# Patient Record
Sex: FEMALE | Race: OTHER | HISPANIC OR LATINO
[De-identification: names, ages, dates, MRNs, and addresses within clinical notes are randomized per-mention and may not be internally consistent; named-entity substitution may affect disease eponyms.]

---

## 2017-06-15 ENCOUNTER — TRANSCRIPTION ENCOUNTER (OUTPATIENT)
Age: 62
End: 2017-06-15

## 2017-06-16 ENCOUNTER — OUTPATIENT (OUTPATIENT)
Dept: OUTPATIENT SERVICES | Facility: HOSPITAL | Age: 62
LOS: 1 days | End: 2017-06-16
Payer: COMMERCIAL

## 2017-06-16 DIAGNOSIS — M54.16 RADICULOPATHY, LUMBAR REGION: ICD-10-CM

## 2017-06-16 PROCEDURE — 62323 NJX INTERLAMINAR LMBR/SAC: CPT

## 2017-06-16 PROCEDURE — 77003 FLUOROGUIDE FOR SPINE INJECT: CPT

## 2017-07-15 ENCOUNTER — OUTPATIENT (OUTPATIENT)
Dept: OUTPATIENT SERVICES | Facility: HOSPITAL | Age: 62
LOS: 1 days | End: 2017-07-15
Payer: COMMERCIAL

## 2017-07-15 DIAGNOSIS — Z51.89 ENCOUNTER FOR OTHER SPECIFIED AFTERCARE: ICD-10-CM

## 2017-07-15 DIAGNOSIS — M54.16 RADICULOPATHY, LUMBAR REGION: ICD-10-CM

## 2017-07-26 ENCOUNTER — OUTPATIENT (OUTPATIENT)
Dept: OUTPATIENT SERVICES | Facility: HOSPITAL | Age: 62
LOS: 1 days | End: 2017-07-26
Payer: COMMERCIAL

## 2017-07-26 DIAGNOSIS — M54.16 RADICULOPATHY, LUMBAR REGION: ICD-10-CM

## 2017-07-26 PROCEDURE — 64483 NJX AA&/STRD TFRM EPI L/S 1: CPT | Mod: LT

## 2017-07-26 PROCEDURE — 76000 FLUOROSCOPY <1 HR PHYS/QHP: CPT

## 2017-07-26 PROCEDURE — 64484 NJX AA&/STRD TFRM EPI L/S EA: CPT | Mod: LT

## 2017-09-12 PROCEDURE — 97110 THERAPEUTIC EXERCISES: CPT

## 2017-09-12 PROCEDURE — 97163 PT EVAL HIGH COMPLEX 45 MIN: CPT

## 2017-09-12 PROCEDURE — 97140 MANUAL THERAPY 1/> REGIONS: CPT

## 2017-09-12 PROCEDURE — 97010 HOT OR COLD PACKS THERAPY: CPT

## 2017-11-28 ENCOUNTER — TRANSCRIPTION ENCOUNTER (OUTPATIENT)
Age: 62
End: 2017-11-28

## 2017-11-29 ENCOUNTER — OUTPATIENT (OUTPATIENT)
Dept: OUTPATIENT SERVICES | Facility: HOSPITAL | Age: 62
LOS: 1 days | End: 2017-11-29
Payer: COMMERCIAL

## 2017-11-29 DIAGNOSIS — M54.16 RADICULOPATHY, LUMBAR REGION: ICD-10-CM

## 2017-11-29 PROCEDURE — 76000 FLUOROSCOPY <1 HR PHYS/QHP: CPT

## 2017-11-29 PROCEDURE — 64483 NJX AA&/STRD TFRM EPI L/S 1: CPT | Mod: LT

## 2017-11-29 PROCEDURE — 64484 NJX AA&/STRD TFRM EPI L/S EA: CPT | Mod: LT

## 2018-04-03 ENCOUNTER — RESULT REVIEW (OUTPATIENT)
Age: 63
End: 2018-04-03

## 2019-08-13 ENCOUNTER — TRANSCRIPTION ENCOUNTER (OUTPATIENT)
Age: 64
End: 2019-08-13

## 2019-10-13 ENCOUNTER — TRANSCRIPTION ENCOUNTER (OUTPATIENT)
Age: 64
End: 2019-10-13

## 2022-10-31 PROBLEM — Z00.00 ENCOUNTER FOR PREVENTIVE HEALTH EXAMINATION: Status: ACTIVE | Noted: 2022-10-31

## 2022-11-03 DIAGNOSIS — M25.571 PAIN IN RIGHT ANKLE AND JOINTS OF RIGHT FOOT: ICD-10-CM

## 2022-11-04 ENCOUNTER — NON-APPOINTMENT (OUTPATIENT)
Age: 67
End: 2022-11-04

## 2022-11-04 ENCOUNTER — APPOINTMENT (OUTPATIENT)
Dept: ORTHOPEDIC SURGERY | Facility: CLINIC | Age: 67
End: 2022-11-04

## 2022-11-04 PROCEDURE — 73610 X-RAY EXAM OF ANKLE: CPT | Mod: LT

## 2022-11-04 PROCEDURE — 99203 OFFICE O/P NEW LOW 30 MIN: CPT

## 2022-11-04 NOTE — DISCUSSION/SUMMARY
[de-identified] : After thorough history full examination and review of x-rays.  It was explained to the patient that she does have a chronic condition in her left ankle.  Which was exacerbated by her new fall.  Given her MRI results.  And the results of the x-rays and physical examination.  She was advised to first try conservative management.  This includes ice pack/moist heat, anti-inflammatories as well as a lace up ankle brace.  However this may only provide temporary relief as well as not as a feeling of stability.  She is advised to use this for approximately 4 weeks.  And to return back to the office.  She was explained that based on the MRI findings and that if she still presents with this feeling of giving way.  She is a candidate for surgical correction.  She was explained the risk benefits pros and cons of surgery as well as alternatives.  She was also explained that there is no guarantee for complete relief.  I was also explained about the postoperative management both in the hospital and upon discharge.  This includes medication as well as weightbearing status.  However the determination for surgical correction will be made when she returns back to the office in 4 weeks.

## 2022-11-04 NOTE — HISTORY OF PRESENT ILLNESS
[de-identified] : LUIS ALFREDO HAMM is a 67 year female being seen for initial visit right ankle pain.  She states that she has had pain for many years but was exacerbated approximately 2 weeks ago following a trip and fall.  She states that over the years she has noticed that although she does not have marked discomfort.  A feeling of giving way/weakness towards her ankle.  She states that this discomfort is mostly on the lateral aspect.  She did seek medical attention in the past and was explained that she did sustain of sprain and to continue with nonsurgical/conservative management.  However most recently she noticed that when walking.  She fell in August for which she receives needed to receive numerous sutures due to a twist and fall.  With the same incident occurring approximately 2 to 3 weeks ago.  She states although she did not sustain any injuries other than her ankle on this recent fall.  She does state that she aggravated her condition and her ankle.  Currently she describes it as a on and off weakness.  With giving way at times.  This occurs sporadically and mostly with standing walking.  She also states that she does get some swelling in her ankles. [Worsening] : worsening [Walking] : walking [Standing] : standing [Intermit.] : ~He/She~ states the symptoms seem to be intermittent

## 2022-11-04 NOTE — PHYSICAL EXAM
[Antalgic] : antalgic [LE] : Sensory: Intact in bilateral lower extremities [ALL] : dorsalis pedis, posterior tibial, femoral, popliteal, and radial 2+ and symmetric bilaterally [de-identified] : On physical examination of the left ankle.  The skin is clean dry and intact with no areas of redness heat or discharge noted.  There is an increase in swelling primarily in the anterior lateral aspect of the ankle.  There is also some pain and tenderness primarily at the anterior lateral aspect of the ankle along the anterior talofibular ligament.  As well as the peroneal brevis.  She does have good passive range of motion with a marked increase with plantar flexion and inversion.  When compared to the opposite sides.  Actively she does have good range of motion.  With some discomfort in the same area.  When performing an anterior drawer test there is a sensation of a clicking.  Indicating that there is ligamentous laxity.  There is no evidence of any motor or sensory deficit.  Patient has good distal pulses.  No evidence of any calf tenderness.  Anterior impingement is not noted. [de-identified] : X-rays of the left ankle are negative\par \par MRI is positive please see the results

## 2022-11-22 ENCOUNTER — APPOINTMENT (OUTPATIENT)
Dept: ORTHOPEDIC SURGERY | Facility: CLINIC | Age: 67
End: 2022-11-22

## 2023-01-20 ENCOUNTER — APPOINTMENT (OUTPATIENT)
Dept: ORTHOPEDIC SURGERY | Facility: CLINIC | Age: 68
End: 2023-01-20
Payer: MEDICARE

## 2023-01-20 VITALS
BODY MASS INDEX: 30.82 KG/M2 | SYSTOLIC BLOOD PRESSURE: 122 MMHG | HEIGHT: 60 IN | WEIGHT: 157 LBS | HEART RATE: 67 BPM | DIASTOLIC BLOOD PRESSURE: 76 MMHG

## 2023-01-20 PROCEDURE — 99213 OFFICE O/P EST LOW 20 MIN: CPT

## 2023-01-20 NOTE — DISCUSSION/SUMMARY
[Medication Risks Reviewed] : Medication risks reviewed [Surgical risks reviewed] : Surgical risks reviewed [de-identified] : The patient has a sprain left ankle initially affecting her activities of daily living. the patient  would benefit from a course of physical therapy for strengthening and stretching exercises.  She will continue with a lace up brace as needed.  She will continue with analgesics and other conservative treatment modalities.  Patient will follow-up in several weeks.  All of her questions were answered to her satisfaction.

## 2023-01-20 NOTE — END OF VISIT
[FreeTextEntry3] : I, Dr. Scar Barney MD, personally performed the evaluation and management services for this established patient who presented today with a new problem/exacerbation of an existing condition. That E/M includes conducting the examination, assessing all new/exacerbated conditions, and establishing a new plan of care. Today, MY ACP was here to assist with recording the history in my evaluation and management services of this new problem/exacerbated condition to be followed going forward.\par

## 2023-01-20 NOTE — REVIEW OF SYSTEMS
[Joint Pain] : joint pain [Joint Stiffness] : joint stiffness [Joint Swelling] : joint swelling [Negative] : Heme/Lymph [FreeTextEntry9] : right ankle pain

## 2023-01-20 NOTE — PHYSICAL EXAM
[Antalgic] : antalgic [LE] : Sensory: Intact in bilateral lower extremities [ALL] : dorsalis pedis, posterior tibial, femoral, popliteal, and radial 2+ and symmetric bilaterally [de-identified] : On physical examination of the left ankle.  The skin is clean dry and intact with no areas of redness heat or discharge noted.  There is an increase in swelling primarily in the anterior lateral aspect of the ankle.  There is also some pain and tenderness primarily at the anterior lateral aspect of the ankle along the anterior talofibular ligament.  As well as the peroneal brevis.  She does have good passive range of motion with a marked increase with plantar flexion and inversion.  When compared to the opposite sides.  Actively she does have good range of motion.  With some discomfort in the same area.  When performing an anterior drawer test there is a sensation of a clicking.  Indicating that there is ligamentous laxity.  There is no evidence of any motor or sensory deficit.  Patient has good distal pulses.  No evidence of any calf tenderness.  Anterior impingement is not noted. [de-identified] : X-rays of the left ankle are negative\par \par MRI is positive please see the results

## 2023-01-20 NOTE — HISTORY OF PRESENT ILLNESS
[Worsening] : worsening [5] : a current pain level of 5/10 [3] : an average pain level of 3/10 [2] : a minimum pain level of 2/10 [7] : a maximum pain level of 7/10 [Standing] : standing [Intermit.] : ~He/She~ states the symptoms seem to be intermittent [Direct Pressure] : worsened by direct pressure [Lifting] : worsened by lifting [Running] : worsened by running [Walking] : worsened by walking [Acetaminophen] : relieved by acetaminophen [Heat] : relieved by heat [NSAIDs] : relieved by nonsteroidal anti-inflammatory drugs [Recumbency] : relieved by recumbency [Rest] : relieved by rest [de-identified] : LUIS ALFREDO HAMM is a 68 year female being seen forfollow up visit right ankle pain.  She states that she has had pain for many years but was exacerbated approximately 8 weeks ago following a trip and fall.  She states that over the years she has noticed that although she does not have marked discomfort.  A feeling of giving way/weakness towards her ankle.  She states that this discomfort is mostly on the lateral aspect.  She did seek medical attention in the past and was explained that she did sustain of sprain and to continue with nonsurgical/conservative management.  However most recently she noticed that when walking.  She fell in August for which she receives needed to receive numerous sutures due to a twist and fall.  With the same incident occurring approximately several weeks ago.  She states although she did not sustain any injuries other than her ankle on this recent fall.  She does state that she aggravated her condition and her ankle.  Currently she describes it as a on and off weakness.  With giving way at times.  This occurs sporadically and mostly with standing walking.  She also states that she does get some swelling in her ankles.  At her last visit, she was placed in a lace up brace which he states seemed to help her.  She is taking anti-inflammatories also on appearing basis for discomfort.  Patient is here to see the surgeon [Ataxia] : no ataxia [Incontinence] : no incontinence [Loss of Dexterity] : good dexterity [Urinary Ret.] : no urinary retention

## 2023-03-17 ENCOUNTER — APPOINTMENT (OUTPATIENT)
Dept: ORTHOPEDIC SURGERY | Facility: CLINIC | Age: 68
End: 2023-03-17
Payer: MEDICARE

## 2023-03-17 VITALS
DIASTOLIC BLOOD PRESSURE: 85 MMHG | BODY MASS INDEX: 30.82 KG/M2 | HEIGHT: 60 IN | SYSTOLIC BLOOD PRESSURE: 132 MMHG | WEIGHT: 157 LBS | HEART RATE: 78 BPM

## 2023-03-17 DIAGNOSIS — S99.912A UNSPECIFIED INJURY OF LEFT ANKLE, INITIAL ENCOUNTER: ICD-10-CM

## 2023-03-17 DIAGNOSIS — S93.492S SPRAIN OF OTHER LIGAMENT OF LEFT ANKLE, SEQUELA: ICD-10-CM

## 2023-03-17 DIAGNOSIS — S99.912S UNSPECIFIED INJURY OF LEFT ANKLE, SEQUELA: ICD-10-CM

## 2023-03-17 PROCEDURE — 99213 OFFICE O/P EST LOW 20 MIN: CPT

## 2023-03-17 NOTE — END OF VISIT
[FreeTextEntry3] : I, Dr. Scar Barney, personally performed the evaluation and management services for this established patient who presents today with a new problem/exacerbation of an existing condition.  The E/M includes conducting the examination, assessing all new/exacerbated conditions, and establishing a new plan of care.  Today, my PA Wilder Wei was here to assist with my evaluation and management services of this new problem/exacerbated condition to be followed going forward.

## 2023-03-17 NOTE — DISCUSSION/SUMMARY
[de-identified] : 25 minutes was spent reviewing the x-rays as well as discussing with the patient their clinical presentation, diagnosis and providing education.  The patient is doing well after her recent round of physical therapy.  Her balance is improved.  She will continue to follow.  If she has increased pain she will return back to the office.  She will otherwise be seen back as needed.

## 2023-03-17 NOTE — PHYSICAL EXAM
[de-identified] : The patient is conversive and in no apparent distress. The patient is alert and oriented to person, place, and time. Affect and mood appear normal. The head is normocephalic and atraumatic. Skin shows normal turgor with no evidence of eczema or psoriasis. No respiratory distress noted. Sensation grossly intact. MUSCULOSKELETAL:   SEE BELOW\par \par Left ankle and foot exam demonstrates skin that is clean, dry intact.  No surgical scars.  No signs of acute trauma.  Normal temperature.  Normal alignment.  Good range of motion without any focal tenderness.  No specific tenderness to the posterior lateral ankle.  No tenderness to the Achilles.  Achilles intact.  No medial ankle or hindfoot tenderness.  Good range of motion without any noted crepitus.  No stiffness.  Negative anterior drawer.  Normal sensation to light touch.  2+ DP pulse. [de-identified] : Unavailable for reviewing.

## 2023-03-17 NOTE — HISTORY OF PRESENT ILLNESS
[de-identified] : Patient presents today for evaluation of a previous right ankle injury.  She reports of chronic ankle pain.  She reports of an injury back in the fall while walking.  She had 2 falls within approximately 2 weeks.  She was last seen in the office in January.  She reports that she has been attending outpatient physical therapy.  She reports of improved function, strength and reduce pain of the left ankle.  Additionally, she reports that her balance is better with her physical therapy.  She has not had any further falls or injuries.  No use of a cane or walker.  No use of bracing.\par \par Review of Systems-\par Constitutional: No fever or chills. \par Cardiovascular: No orthopnea or chest pain\par Pulmonary: No shortness of breath. \par GI: No nausea or vomiting or abdominal pain.\par Musculoskeletal: see HPI \par Psychiatric: No anxiety and depression.

## 2023-05-19 PROBLEM — Z86.39 HISTORY OF HYPERCHOLESTEROLEMIA: Status: RESOLVED | Noted: 2023-05-19 | Resolved: 2023-05-19

## 2023-05-19 PROBLEM — Z78.9 SOCIAL ALCOHOL USE: Status: ACTIVE | Noted: 2023-05-19

## 2023-05-19 PROBLEM — Z83.79 FAMILY HISTORY OF LIVER DISEASE: Status: ACTIVE | Noted: 2023-05-19

## 2023-05-19 PROBLEM — R35.0 FREQUENT URINATION: Status: ACTIVE | Noted: 2023-05-19

## 2023-05-19 PROBLEM — Z87.828 HISTORY OF HEAD INJURY: Status: RESOLVED | Noted: 2023-05-19 | Resolved: 2023-05-19

## 2023-05-19 PROBLEM — R35.1 NOCTURIA: Status: ACTIVE | Noted: 2023-05-19

## 2023-05-19 NOTE — REASON FOR VISIT
[Questionnaire Received] : Patient questionnaire received [Urinary Incontinence] : urinary incontinence [Pelvic Organ Prolapse] : pelvic organ prolapse [Nocturia] : nocturia

## 2023-05-22 ENCOUNTER — RESULT CHARGE (OUTPATIENT)
Age: 68
End: 2023-05-22

## 2023-05-22 ENCOUNTER — APPOINTMENT (OUTPATIENT)
Dept: UROGYNECOLOGY | Facility: CLINIC | Age: 68
End: 2023-05-22
Payer: MEDICARE

## 2023-05-22 VITALS
HEIGHT: 60 IN | SYSTOLIC BLOOD PRESSURE: 130 MMHG | WEIGHT: 155 LBS | BODY MASS INDEX: 30.43 KG/M2 | DIASTOLIC BLOOD PRESSURE: 90 MMHG

## 2023-05-22 DIAGNOSIS — Z83.79 FAMILY HISTORY OF OTHER DISEASES OF THE DIGESTIVE SYSTEM: ICD-10-CM

## 2023-05-22 DIAGNOSIS — Z78.9 OTHER SPECIFIED HEALTH STATUS: ICD-10-CM

## 2023-05-22 DIAGNOSIS — R35.0 FREQUENCY OF MICTURITION: ICD-10-CM

## 2023-05-22 DIAGNOSIS — Z86.39 PERSONAL HISTORY OF OTHER ENDOCRINE, NUTRITIONAL AND METABOLIC DISEASE: ICD-10-CM

## 2023-05-22 DIAGNOSIS — Z87.828 PERSONAL HISTORY OF OTHER (HEALED) PHYSICAL INJURY AND TRAUMA: ICD-10-CM

## 2023-05-22 DIAGNOSIS — R35.1 NOCTURIA: ICD-10-CM

## 2023-05-22 LAB
BILIRUB UR QL STRIP: NEGATIVE
CLARITY UR: CLEAR
COLLECTION METHOD: NORMAL
GLUCOSE UR-MCNC: NEGATIVE
HCG UR QL: 0.2 EU/DL
HGB UR QL STRIP.AUTO: NORMAL
KETONES UR-MCNC: NEGATIVE
LEUKOCYTE ESTERASE UR QL STRIP: NEGATIVE
NITRITE UR QL STRIP: NEGATIVE
PH UR STRIP: 7.5
PROT UR STRIP-MCNC: NEGATIVE
SP GR UR STRIP: 1.02

## 2023-05-22 PROCEDURE — 51701 INSERT BLADDER CATHETER: CPT | Mod: 59

## 2023-05-22 PROCEDURE — 99204 OFFICE O/P NEW MOD 45 MIN: CPT | Mod: 25

## 2023-05-22 PROCEDURE — 81003 URINALYSIS AUTO W/O SCOPE: CPT | Mod: QW

## 2023-05-22 RX ORDER — UBIDECARENONE/VIT E ACET 100MG-5
CAPSULE ORAL
Refills: 0 | Status: ACTIVE | COMMUNITY

## 2023-05-22 RX ORDER — ESTRADIOL 0.1 MG/G
0.1 CREAM VAGINAL
Refills: 0 | Status: ACTIVE | COMMUNITY

## 2023-05-22 RX ORDER — MULTIVIT-MIN/IRON/FOLIC ACID/K 18-600-40
CAPSULE ORAL
Refills: 0 | Status: ACTIVE | COMMUNITY

## 2023-05-22 NOTE — HISTORY OF PRESENT ILLNESS
[FreeTextEntry1] : 67 yo  with complaints of prolapse. Last September she was diagnosed with vaginal atrophy and was given estrogen. About 2 months ago she noticed a vaginal protrusion. She feels the prolapse comes and goes and is very uncomfortable. She sometimes feels she is not emptying her bladder fully. She leaks with cough and sneeze. That has been a problems for 30 years and is getting progressively worse. She also leaks with strong urge, especially in the morning. Not wearing pads unless she is going on a long drive. She voids frequently. Gets up 3 times at night. She has noticed recently a little difficulty evacuating her bowels. Has not tried a pessary but thinks her mom had one.

## 2023-05-22 NOTE — OB HISTORY
[Vaginal ___] : [unfilled] vaginal delivery(s) [Definite ___ (Date)] : the last menstrual period was [unfilled] [Last Pap Smear ___] : date of last pap smear was on [unfilled] [Taking Estrogens] : taking estrogen replacement [Sexually Active] : sexually active [Abnormal Pap Smear] : normal pap smear [FreeTextEntry1] : largest baby 7lbs

## 2023-05-22 NOTE — PHYSICAL EXAM
[Chaperone Present] : A chaperone was present in the examining room during all aspects of the physical examination [No Acute Distress] : in no acute distress [Well developed] : well developed [Well Nourished] : ~L well nourished [Oriented x3] : oriented to person, place, and time [No Edema] : ~T edema was not present [Warm and Dry] : was warm and dry to touch [Normal Gait] : gait was normal [Normal Appearance] : general appearance was normal [2] : 2 [Aa ____] : Aa [unfilled] [Ba ____] : Ba [unfilled] [C ____] : C [unfilled] [GH ____] : GH [unfilled] [PB ____] : PB [unfilled] [TVL ____] : TVL  [unfilled] [Ap ____] : Ap [unfilled] [Bp ____] : Bp [unfilled] [D ____] : D [unfilled] [Normal] : no abnormalities [Post Void Residual ____ml] : post void residual was [unfilled] ml [Cough] : no cough [Tenderness] : ~T no ~M abdominal tenderness observed [Distended] : not distended [Hernia] : no hernia observed

## 2023-05-22 NOTE — LETTER BODY
[Attached please find my note.] : Attached please find my note. [Thank you very much for allowing me to participate in the care of this patient. If you have any questions, please do not hesitate to contact me] : Thank you very much for allowing me to participate in the care of this patient. If you have any questions, please do not hesitate to contact me. [Dear  ___] : Dear  [unfilled], [I had the pleasure of evaluating your patient, [unfilled]. Thank you for referring Ms. [unfilled] for consultation for ___] : I had the pleasure of evaluating your patient, [unfilled]. Thank you for referring Ms. [unfilled] for consultation for [unfilled]. [DrIsmael  ___] : Dr. FRANCES

## 2023-05-22 NOTE — DISCUSSION/SUMMARY
[FreeTextEntry1] : Ms. HAMM is 68 with tricompartment prolapse, mixed UI, hematuria on clean catch, difficulty evacuating bowels. We talked about the etiology and natural progression of pelvic organ prolapse. We discussed the treatment options of a pessary, physical therapy or surgery. In terms of surgery we talked about open procedures, robotic assisted procedures and vaginal reconstruction with or without the utilization of graft material. We discussed what is involved with presurgical testing if she decided for surgery and the additional blood work they would order. We also discussed what is involved in recovery, post-op restrictions including restrictions on lifting, submerging in water and exercise. I recommended bladder testing UDTs, pelvic U/S. We talked about the types of urinary incontinence and the treatment options. We reviewed physical therapy, medication, surgery, vaginal inserts and third line treatments. I gave her some literature on pelvic floor muscle strengthening, POP, pessary and SCP. I think she would be a good candidate for a robotic repair but would need some sutures vaginally to help support the posterior compartment. She is very uncomfortable and thinks she may want to try a pessary to get immediate relief. I sent her urine to the lab. I was able to answer all of her questions.\par

## 2023-05-23 LAB
APPEARANCE: CLEAR
BACTERIA: NEGATIVE /HPF
BILIRUBIN URINE: NEGATIVE
BLOOD URINE: NEGATIVE
CAST: 0 /LPF
COLOR: YELLOW
EPITHELIAL CELLS: 1 /HPF
GLUCOSE QUALITATIVE U: NEGATIVE MG/DL
KETONES URINE: NEGATIVE MG/DL
LEUKOCYTE ESTERASE URINE: NEGATIVE
MICROSCOPIC-UA: NORMAL
NITRITE URINE: NEGATIVE
PH URINE: 7.5
PROTEIN URINE: NEGATIVE MG/DL
RED BLOOD CELLS URINE: 1 /HPF
SPECIFIC GRAVITY URINE: 1.01
UROBILINOGEN URINE: 0.2 MG/DL
WHITE BLOOD CELLS URINE: 0 /HPF

## 2023-05-24 LAB
BACTERIA UR CULT: NORMAL
URINE CYTOLOGY: NORMAL

## 2023-06-08 ENCOUNTER — APPOINTMENT (OUTPATIENT)
Dept: ULTRASOUND IMAGING | Facility: CLINIC | Age: 68
End: 2023-06-08

## 2023-06-08 ENCOUNTER — OUTPATIENT (OUTPATIENT)
Dept: OUTPATIENT SERVICES | Facility: HOSPITAL | Age: 68
LOS: 1 days | End: 2023-06-08
Payer: MEDICARE

## 2023-06-08 DIAGNOSIS — N81.9 FEMALE GENITAL PROLAPSE, UNSPECIFIED: ICD-10-CM

## 2023-06-08 PROCEDURE — 76856 US EXAM PELVIC COMPLETE: CPT | Mod: 26

## 2023-06-08 PROCEDURE — 76830 TRANSVAGINAL US NON-OB: CPT | Mod: 26

## 2023-06-10 ENCOUNTER — TRANSCRIPTION ENCOUNTER (OUTPATIENT)
Age: 68
End: 2023-06-10

## 2023-06-13 DIAGNOSIS — N94.89 OTHER SPECIFIED CONDITIONS ASSOCIATED WITH FEMALE GENITAL ORGANS AND MENSTRUAL CYCLE: ICD-10-CM

## 2023-06-15 ENCOUNTER — APPOINTMENT (OUTPATIENT)
Dept: UROGYNECOLOGY | Facility: CLINIC | Age: 68
End: 2023-06-15
Payer: MEDICARE

## 2023-06-15 VITALS — OXYGEN SATURATION: 96 % | DIASTOLIC BLOOD PRESSURE: 70 MMHG | HEART RATE: 61 BPM | SYSTOLIC BLOOD PRESSURE: 127 MMHG

## 2023-06-15 PROCEDURE — 57160 INSERT PESSARY/OTHER DEVICE: CPT

## 2023-06-15 PROCEDURE — A4562: CPT

## 2023-06-26 ENCOUNTER — NON-APPOINTMENT (OUTPATIENT)
Age: 68
End: 2023-06-26

## 2023-06-27 ENCOUNTER — APPOINTMENT (OUTPATIENT)
Dept: UROGYNECOLOGY | Facility: CLINIC | Age: 68
End: 2023-06-27
Payer: MEDICARE

## 2023-06-27 PROCEDURE — 99213 OFFICE O/P EST LOW 20 MIN: CPT

## 2023-06-29 ENCOUNTER — APPOINTMENT (OUTPATIENT)
Dept: UROGYNECOLOGY | Facility: CLINIC | Age: 68
End: 2023-06-29
Payer: MEDICARE

## 2023-06-29 PROCEDURE — 99212 OFFICE O/P EST SF 10 MIN: CPT

## 2023-07-05 ENCOUNTER — APPOINTMENT (OUTPATIENT)
Dept: UROGYNECOLOGY | Facility: CLINIC | Age: 68
End: 2023-07-05

## 2023-07-06 ENCOUNTER — APPOINTMENT (OUTPATIENT)
Dept: UROGYNECOLOGY | Facility: CLINIC | Age: 68
End: 2023-07-06
Payer: MEDICARE

## 2023-07-06 PROCEDURE — 51797 INTRAABDOMINAL PRESSURE TEST: CPT

## 2023-07-06 PROCEDURE — 51729 CYSTOMETROGRAM W/VP&UP: CPT

## 2023-07-06 PROCEDURE — 51741 ELECTRO-UROFLOWMETRY FIRST: CPT

## 2023-07-06 PROCEDURE — 51784 ANAL/URINARY MUSCLE STUDY: CPT

## 2023-07-10 ENCOUNTER — OUTPATIENT (OUTPATIENT)
Dept: OUTPATIENT SERVICES | Facility: HOSPITAL | Age: 68
LOS: 1 days | End: 2023-07-10
Payer: COMMERCIAL

## 2023-07-10 ENCOUNTER — APPOINTMENT (OUTPATIENT)
Dept: UROGYNECOLOGY | Facility: CLINIC | Age: 68
End: 2023-07-10

## 2023-07-10 ENCOUNTER — APPOINTMENT (OUTPATIENT)
Dept: UROGYNECOLOGY | Facility: CLINIC | Age: 68
End: 2023-07-10
Payer: MEDICARE

## 2023-07-10 ENCOUNTER — APPOINTMENT (OUTPATIENT)
Dept: MRI IMAGING | Facility: CLINIC | Age: 68
End: 2023-07-10
Payer: MEDICARE

## 2023-07-10 DIAGNOSIS — N94.89 OTHER SPECIFIED CONDITIONS ASSOCIATED WITH FEMALE GENITAL ORGANS AND MENSTRUAL CYCLE: ICD-10-CM

## 2023-07-10 PROCEDURE — 72197 MRI PELVIS W/O & W/DYE: CPT | Mod: 26

## 2023-07-10 PROCEDURE — 99213 OFFICE O/P EST LOW 20 MIN: CPT

## 2023-07-10 PROCEDURE — A9585: CPT

## 2023-07-10 PROCEDURE — 72197 MRI PELVIS W/O & W/DYE: CPT

## 2023-07-19 DIAGNOSIS — R16.0 HEPATOMEGALY, NOT ELSEWHERE CLASSIFIED: ICD-10-CM

## 2023-07-19 DIAGNOSIS — N28.89 OTHER SPECIFIED DISORDERS OF KIDNEY AND URETER: ICD-10-CM

## 2023-08-06 ENCOUNTER — APPOINTMENT (OUTPATIENT)
Dept: MRI IMAGING | Facility: CLINIC | Age: 68
End: 2023-08-06

## 2023-08-25 ENCOUNTER — APPOINTMENT (OUTPATIENT)
Dept: MRI IMAGING | Facility: CLINIC | Age: 68
End: 2023-08-25

## 2023-08-25 ENCOUNTER — OUTPATIENT (OUTPATIENT)
Dept: OUTPATIENT SERVICES | Facility: HOSPITAL | Age: 68
LOS: 1 days | End: 2023-08-25
Payer: MEDICARE

## 2023-08-25 ENCOUNTER — APPOINTMENT (OUTPATIENT)
Dept: UROGYNECOLOGY | Facility: CLINIC | Age: 68
End: 2023-08-25
Payer: MEDICARE

## 2023-08-25 DIAGNOSIS — N28.89 OTHER SPECIFIED DISORDERS OF KIDNEY AND URETER: ICD-10-CM

## 2023-08-25 DIAGNOSIS — R16.0 HEPATOMEGALY, NOT ELSEWHERE CLASSIFIED: ICD-10-CM

## 2023-08-25 PROCEDURE — 74183 MRI ABD W/O CNTR FLWD CNTR: CPT | Mod: 26

## 2023-08-25 PROCEDURE — 99212 OFFICE O/P EST SF 10 MIN: CPT

## 2023-09-07 ENCOUNTER — OUTPATIENT (OUTPATIENT)
Dept: OUTPATIENT SERVICES | Facility: HOSPITAL | Age: 68
LOS: 1 days | End: 2023-09-07
Payer: COMMERCIAL

## 2023-09-07 ENCOUNTER — APPOINTMENT (OUTPATIENT)
Dept: UROGYNECOLOGY | Facility: CLINIC | Age: 68
End: 2023-09-07
Payer: MEDICARE

## 2023-09-07 VITALS
HEART RATE: 77 BPM | WEIGHT: 158.73 LBS | TEMPERATURE: 98 F | HEIGHT: 60 IN | SYSTOLIC BLOOD PRESSURE: 112 MMHG | OXYGEN SATURATION: 97 % | DIASTOLIC BLOOD PRESSURE: 78 MMHG

## 2023-09-07 DIAGNOSIS — N81.9 FEMALE GENITAL PROLAPSE, UNSPECIFIED: ICD-10-CM

## 2023-09-07 DIAGNOSIS — Z29.9 ENCOUNTER FOR PROPHYLACTIC MEASURES, UNSPECIFIED: ICD-10-CM

## 2023-09-07 DIAGNOSIS — Z98.51 TUBAL LIGATION STATUS: Chronic | ICD-10-CM

## 2023-09-07 DIAGNOSIS — E78.00 PURE HYPERCHOLESTEROLEMIA, UNSPECIFIED: ICD-10-CM

## 2023-09-07 DIAGNOSIS — Z01.818 ENCOUNTER FOR OTHER PREPROCEDURAL EXAMINATION: ICD-10-CM

## 2023-09-07 DIAGNOSIS — N39.3 STRESS INCONTINENCE (FEMALE) (MALE): ICD-10-CM

## 2023-09-07 LAB
A1C WITH ESTIMATED AVERAGE GLUCOSE RESULT: 5.7 % — HIGH (ref 4–5.6)
ANION GAP SERPL CALC-SCNC: 11 MMOL/L — SIGNIFICANT CHANGE UP (ref 5–17)
APPEARANCE UR: CLEAR — SIGNIFICANT CHANGE UP
APTT BLD: 29.1 SEC — SIGNIFICANT CHANGE UP (ref 24.5–35.6)
BACTERIA # UR AUTO: ABNORMAL
BASOPHILS # BLD AUTO: 0.03 K/UL — SIGNIFICANT CHANGE UP (ref 0–0.2)
BASOPHILS NFR BLD AUTO: 0.5 % — SIGNIFICANT CHANGE UP (ref 0–2)
BILIRUB UR-MCNC: NEGATIVE — SIGNIFICANT CHANGE UP
BLD GP AB SCN SERPL QL: SIGNIFICANT CHANGE UP
BUN SERPL-MCNC: 24.1 MG/DL — HIGH (ref 8–20)
CALCIUM SERPL-MCNC: 8.9 MG/DL — SIGNIFICANT CHANGE UP (ref 8.4–10.5)
CHLORIDE SERPL-SCNC: 105 MMOL/L — SIGNIFICANT CHANGE UP (ref 96–108)
CO2 SERPL-SCNC: 23 MMOL/L — SIGNIFICANT CHANGE UP (ref 22–29)
COD CRY URNS QL: ABNORMAL
COLOR SPEC: YELLOW — SIGNIFICANT CHANGE UP
CREAT SERPL-MCNC: 0.93 MG/DL — SIGNIFICANT CHANGE UP (ref 0.5–1.3)
DIFF PNL FLD: ABNORMAL
EGFR: 67 ML/MIN/1.73M2 — SIGNIFICANT CHANGE UP
EOSINOPHIL # BLD AUTO: 0.14 K/UL — SIGNIFICANT CHANGE UP (ref 0–0.5)
EOSINOPHIL NFR BLD AUTO: 2.2 % — SIGNIFICANT CHANGE UP (ref 0–6)
EPI CELLS # UR: SIGNIFICANT CHANGE UP
ESTIMATED AVERAGE GLUCOSE: 117 MG/DL — HIGH (ref 68–114)
GLUCOSE SERPL-MCNC: 106 MG/DL — HIGH (ref 70–99)
GLUCOSE UR QL: NEGATIVE MG/DL — SIGNIFICANT CHANGE UP
HCT VFR BLD CALC: 40.2 % — SIGNIFICANT CHANGE UP (ref 34.5–45)
HGB BLD-MCNC: 13.1 G/DL — SIGNIFICANT CHANGE UP (ref 11.5–15.5)
IMM GRANULOCYTES NFR BLD AUTO: 0.5 % — SIGNIFICANT CHANGE UP (ref 0–0.9)
INR BLD: 0.91 RATIO — SIGNIFICANT CHANGE UP (ref 0.85–1.18)
KETONES UR-MCNC: ABNORMAL
LEUKOCYTE ESTERASE UR-ACNC: ABNORMAL
LYMPHOCYTES # BLD AUTO: 1.38 K/UL — SIGNIFICANT CHANGE UP (ref 1–3.3)
LYMPHOCYTES # BLD AUTO: 21.2 % — SIGNIFICANT CHANGE UP (ref 13–44)
MCHC RBC-ENTMCNC: 28.8 PG — SIGNIFICANT CHANGE UP (ref 27–34)
MCHC RBC-ENTMCNC: 32.6 GM/DL — SIGNIFICANT CHANGE UP (ref 32–36)
MCV RBC AUTO: 88.4 FL — SIGNIFICANT CHANGE UP (ref 80–100)
MONOCYTES # BLD AUTO: 0.49 K/UL — SIGNIFICANT CHANGE UP (ref 0–0.9)
MONOCYTES NFR BLD AUTO: 7.5 % — SIGNIFICANT CHANGE UP (ref 2–14)
NEUTROPHILS # BLD AUTO: 4.43 K/UL — SIGNIFICANT CHANGE UP (ref 1.8–7.4)
NEUTROPHILS NFR BLD AUTO: 68.1 % — SIGNIFICANT CHANGE UP (ref 43–77)
NITRITE UR-MCNC: NEGATIVE — SIGNIFICANT CHANGE UP
PH UR: 6.5 — SIGNIFICANT CHANGE UP (ref 5–8)
PLATELET # BLD AUTO: 260 K/UL — SIGNIFICANT CHANGE UP (ref 150–400)
POTASSIUM SERPL-MCNC: 4.3 MMOL/L — SIGNIFICANT CHANGE UP (ref 3.5–5.3)
POTASSIUM SERPL-SCNC: 4.3 MMOL/L — SIGNIFICANT CHANGE UP (ref 3.5–5.3)
PROT UR-MCNC: SIGNIFICANT CHANGE UP MG/DL
PROTHROM AB SERPL-ACNC: 10.1 SEC — SIGNIFICANT CHANGE UP (ref 9.5–13)
RBC # BLD: 4.55 M/UL — SIGNIFICANT CHANGE UP (ref 3.8–5.2)
RBC # FLD: 13.9 % — SIGNIFICANT CHANGE UP (ref 10.3–14.5)
RBC CASTS # UR COMP ASSIST: ABNORMAL /HPF (ref 0–4)
SODIUM SERPL-SCNC: 139 MMOL/L — SIGNIFICANT CHANGE UP (ref 135–145)
SP GR SPEC: 1.01 — SIGNIFICANT CHANGE UP (ref 1.01–1.02)
UROBILINOGEN FLD QL: NEGATIVE MG/DL — SIGNIFICANT CHANGE UP
WBC # BLD: 6.5 K/UL — SIGNIFICANT CHANGE UP (ref 3.8–10.5)
WBC # FLD AUTO: 6.5 K/UL — SIGNIFICANT CHANGE UP (ref 3.8–10.5)
WBC UR QL: ABNORMAL /HPF (ref 0–5)

## 2023-09-07 PROCEDURE — 93005 ELECTROCARDIOGRAM TRACING: CPT

## 2023-09-07 PROCEDURE — G0463: CPT

## 2023-09-07 PROCEDURE — 93010 ELECTROCARDIOGRAM REPORT: CPT

## 2023-09-07 PROCEDURE — 99214 OFFICE O/P EST MOD 30 MIN: CPT

## 2023-09-07 NOTE — H&P PST ADULT - EKG AND INTERPRETATION
Normal sinus rhythm, minimal voltage criteria for LVH may be normal variant 70bpm. EKG pending official review

## 2023-09-07 NOTE — H&P PST ADULT - PROBLEM SELECTOR PLAN 1
Robotic Supracervical Hysterectomy with Bilateral Salpingo-Oophorecotmy Robotic Sacrocolpopexy, Posterior Repair, Cystoscopy, Suburethral sling with Dr. Peck 9/27/23 Robotic Supracervical Hysterectomy with Bilateral Salpingo-Oophorecotmy Robotic Sacrocolpopexy, Posterior Repair, Cystoscopy, Suburethral sling with Dr. Peck 9/27/23    Pt instructed to stop vitamins/supplements/herbal medications/ASA/NSAIDS for one week prior to surgery and discuss with PMD.  Patient educated on surgical scrub, ERP, preadmission instructions, medical clearance and day of procedure medications, verbalizes understanding.  Instructed patient not to eat or drink anything after midnight night before surgery or the morning of surgery follow the ERP instructions.  Patient verbally expressed understanding

## 2023-09-07 NOTE — H&P PST ADULT - ASSESSMENT
CAPRINI SCORE    AGE RELATED RISK FACTORS                                                             [ ] Age 41-60 years                                            (1 Point)  [ ] Age: 61-74 years                                           (2 Points)                 [ ] Age= 75 years                                                (3 Points)             DISEASE RELATED RISK FACTORS                                                       [ ] Edema in the lower extremities                 (1 Point)                     [ ] Varicose veins                                               (1 Point)                                 [ ] BMI > 25 Kg/m2                                            (1 Point)                                  [ ] Serious infection (ie PNA)                            (1 Point)                     [ ] Lung disease ( COPD, Emphysema)            (1 Point)                                                                          [ ] Acute myocardial infarction                         (1 Point)                  [ ] Congestive heart failure (in the previous month)  (1 Point)         [ ] Inflammatory bowel disease                            (1 Point)                  [ ] Central venous access, PICC or Port               (2 points)       (within the last month)                                                                [ ] Stroke (in the previous month)                        (5 Points)    [ ] Previous or present malignancy                       (2 points)                                                                                                                                                         HEMATOLOGY RELATED FACTORS                                                         [ ] Prior episodes of VTE                                     (3 Points)                     [ ] Positive family history for VTE                      (3 Points)                  [ ] Prothrombin 39670 A                                     (3 Points)                     [ ] Factor V Leiden                                                (3 Points)                        [ ] Lupus anticoagulants                                      (3 Points)                                                           [ ] Anticardiolipin antibodies                              (3 Points)                                                       [ ] High homocysteine in the blood                   (3 Points)                                             [ ] Other congenital or acquired thrombophilia      (3 Points)                                                [ ] Heparin induced thrombocytopenia                  (3 Points)                                        MOBILITY RELATED FACTORS  [ ] Bed rest                                                         (1 Point)  [ ] Plaster cast                                                    (2 points)  [ ] Bed bound for more than 72 hours           (2 Points)    GENDER SPECIFIC FACTORS  [ ] Pregnancy or had a baby within the last month   (1 Point)  [ ] Post-partum < 6 weeks                                   (1 Point)  [ ] Hormonal therapy  or oral contraception   (1 Point)  [ ] History of pregnancy complications              (1 point)  [ ] Unexplained or recurrent              (1 Point)    OTHER RISK FACTORS                                           (1 Point)  [ ] BMI >40, smoking, diabetes requiring insulin, chemotherapy  blood transfusions and length of surgery over 2 hours    SURGERY RELATED RISK FACTORS  [ ]  Section within the last month     (1 Point)  [ ] Minor surgery                                                  (1 Point)  [ ] Arthroscopic surgery                                       (2 Points)  [ ] Planned major surgery lasting more            (2 Points)      than 45 minutes     [ ] Elective hip or knee joint replacement       (5 points)       surgery                                                TRAUMA RELATED RISK FACTORS  [ ] Fracture of the hip, pelvis, or leg                       (5 Points)  [ ] Spinal cord injury resulting in paralysis             (5 points)       (in the previous month)    [ ] Paralysis  (less than 1 month)                             (5 Points)  [ ] Multiple Trauma within 1 month                        (5 Points)    Total Score [        ]    Caprini Score 0-2: Low Risk, NO VTE prophylaxis required for most patients, encourage ambulation  Caprini Score 3-6: Moderate Risk , pharmacologic VTE prophylaxis is indicated for most patients (in the absence of contraindications)  Caprini Score Greater than or =7: High risk, pharmocologic VTE prophylaxis indicated for most patients (in the absence of contraindications)                              OPIOID RISK TOOL    PETAR EACH BOX THAT APPLIES AND ADD TOTALS AT THE END    FAMILY HISTORY OF SUBSTANCE ABUSE                 FEMALE         MALE                                                Alcohol                             [  ]1 pt          [  ]3pts                                               Illegal Durgs                     [  ]2 pts        [  ]3pts                                               Rx Drugs                           [  ]4 pts        [  ]4 pts    PERSONAL HISTORY OF SUBSTANCE ABUSE                                                                                          Alcohol                             [  ]3 pts       [  ]3 pts                                               Illegal Drugs                     [  ]4 pts        [  ]4 pts                                               Rx Drugs                           [  ]5 pts        [  ]5 pts    AGE BETWEEN 16-45 YEARS                                      [  ]1 pt         [  ]1 pt    HISTORY OF PREADOLESCENT   SEXUAL ABUSE                                                             [  ]3 pts        [  ]0pts    PSYCHOLOGICAL DISEASE                     ADD, OCD, Bipolar, Schizophrenia        [  ]2 pts         [  ]2 pts                      Depression                                               [  ]1 pt           [  ]1 pt           SCORING TOTAL   (add numbers and type here)              (***)                                     A score of 3 or lower indicated LOW risk for future opioid abuse  A score of 4 to 7 indicated moderate risk for future opioid abuse  A score of 8 or higher indicates a high risk for opioid abuse     68 year old Vietnamese speaking female presents to [facility]. Patient's medical history includes HDL, Stress Incontinence. Patient reports she has a uterine prolapse concerns about her vagina was protruding she has a pessary in place. Patient reports urinary incontinence especially while sitting to standing position as well as urge incontinence. States she's voiding every hour sometimes she has straining. Denies hematuria, fever, chills, patient also indicates that she was prescribed vaginal cream, but does not use it every day. She is schedule for Robotic Supracervical Hysterectomy with Bilateral Salpingo-Oophorecotmy Robotic Sacrocolpopexy, Posterior Repair, Cystoscopy, Suburethral sling with Dr. Peck 23    CAPRINI SCORE    AGE RELATED RISK FACTORS                                                             [ ] Age 41-60 years                                            (1 Point)  [x ] Age: 61-74 years                                           (2 Points)                 [ ] Age= 75 years                                                (3 Points)             DISEASE RELATED RISK FACTORS                                                       [ ] Edema in the lower extremities                 (1 Point)                     [ ] Varicose veins                                               (1 Point)                                 [ ] BMI > 25 Kg/m2                                            (1 Point)                                  [ ] Serious infection (ie PNA)                            (1 Point)                     [ ] Lung disease ( COPD, Emphysema)            (1 Point)                                                                          [ ] Acute myocardial infarction                         (1 Point)                  [ ] Congestive heart failure (in the previous month)  (1 Point)         [ ] Inflammatory bowel disease                            (1 Point)                  [ ] Central venous access, PICC or Port               (2 points)       (within the last month)                                                                [ ] Stroke (in the previous month)                        (5 Points)    [ ] Previous or present malignancy                       (2 points)                                                                                                                                                         HEMATOLOGY RELATED FACTORS                                                         [ ] Prior episodes of VTE                                     (3 Points)                     [ ] Positive family history for VTE                      (3 Points)                  [ ] Prothrombin 54346 A                                     (3 Points)                     [ ] Factor V Leiden                                                (3 Points)                        [ ] Lupus anticoagulants                                      (3 Points)                                                           [ ] Anticardiolipin antibodies                              (3 Points)                                                       [ ] High homocysteine in the blood                   (3 Points)                                             [ ] Other congenital or acquired thrombophilia      (3 Points)                                                [ ] Heparin induced thrombocytopenia                  (3 Points)                                        MOBILITY RELATED FACTORS  [ ] Bed rest                                                         (1 Point)  [ ] Plaster cast                                                    (2 points)  [ ] Bed bound for more than 72 hours           (2 Points)    GENDER SPECIFIC FACTORS  [ ] Pregnancy or had a baby within the last month   (1 Point)  [ ] Post-partum < 6 weeks                                   (1 Point)  [ ] Hormonal therapy  or oral contraception   (1 Point)  [ ] History of pregnancy complications              (1 point)  [ ] Unexplained or recurrent              (1 Point)    OTHER RISK FACTORS                                           (1 Point)  [ ] BMI >40, smoking, diabetes requiring insulin, chemotherapy  blood transfusions and length of surgery over 2 hours    SURGERY RELATED RISK FACTORS  [ ]  Section within the last month     (1 Point)  [ ] Minor surgery                                                  (1 Point)  [ ] Arthroscopic surgery                                       (2 Points)  [x ] Planned major surgery lasting more            (2 Points)      than 45 minutes     [ ] Elective hip or knee joint replacement       (5 points)       surgery                                                TRAUMA RELATED RISK FACTORS  [ ] Fracture of the hip, pelvis, or leg                       (5 Points)  [ ] Spinal cord injury resulting in paralysis             (5 points)       (in the previous month)    [ ] Paralysis  (less than 1 month)                             (5 Points)  [ ] Multiple Trauma within 1 month                        (5 Points)    Total Score [    4    ]    Caprini Score 0-2: Low Risk, NO VTE prophylaxis required for most patients, encourage ambulation  Caprini Score 3-6: Moderate Risk , pharmacologic VTE prophylaxis is indicated for most patients (in the absence of contraindications)  Caprini Score Greater than or =7: High risk, pharmocologic VTE prophylaxis indicated for most patients (in the absence of contraindications)      OPIOID RISK TOOL    PETAR EACH BOX THAT APPLIES AND ADD TOTALS AT THE END    FAMILY HISTORY OF SUBSTANCE ABUSE                 FEMALE         MALE                                                Alcohol                             [  ]1 pt          [  ]3pts                                               Illegal Durgs                     [  ]2 pts        [  ]3pts                                               Rx Drugs                           [  ]4 pts        [  ]4 pts    PERSONAL HISTORY OF SUBSTANCE ABUSE                                                                                          Alcohol                             [  ]3 pts       [  ]3 pts                                               Illegal Drugs                     [  ]4 pts        [  ]4 pts                                               Rx Drugs                           [  ]5 pts        [  ]5 pts    AGE BETWEEN 16-45 YEARS                                      [  ]1 pt         [  ]1 pt    HISTORY OF PREADOLESCENT   SEXUAL ABUSE                                                             [  ]3 pts        [  ]0pts    PSYCHOLOGICAL DISEASE                     ADD, OCD, Bipolar, Schizophrenia        [  ]2 pts         [  ]2 pts                      Depression                                               [  ]1 pt           [  ]1 pt           SCORING TOTAL   (add numbers and type here)              (*0**)                                     A score of 3 or lower indicated LOW risk for future opioid abuse  A score of 4 to 7 indicated moderate risk for future opioid abuse  A score of 8 or higher indicates a high risk for opioid abuse

## 2023-09-07 NOTE — H&P PST ADULT - NSANTHOSAYNRD_GEN_A_CORE
No. TERE screening performed.  STOP BANG Legend: 0-2 = LOW Risk; 3-4 = INTERMEDIATE Risk; 5-8 = HIGH Risk

## 2023-09-07 NOTE — HISTORY OF PRESENT ILLNESS
[FreeTextEntry1] : Shari has long standing prolapse. She has a pessary in but wants definitive treatment. She has also tried behavioral treatment. She had urodynamic testing that confirmed VIVIANE. She had a pelvic U/S that showed fibroids. She also had an MRI that showed a liver mass. She is following up with primary to have repeat MRI in 6 months as the lesion appeared benign. R/B/A BSO d/w patient.

## 2023-09-07 NOTE — H&P PST ADULT - HISTORY OF PRESENT ILLNESS
The patient presents today for followup. No dysuria, gross hematuria, fever, chills. She continues to have urinary incontinence, especially while changing from sitting to standing position, as well as urge incontinence. She is voiding daytime every 1 hour in the morning especially after taking Lasix, which tapers off in the afternoon, nocturia time 0. No incontinence. No straining to urinate. Good stream, emptying well. No bowel issues, however, she also indicates that while using her vaginal cream, she has difficulty doing this as she feels protrusion in the vagina, and very concerned if she has a prolapse.   68 year old Urdu speaking female presents to [email or web address] Patient's medical history includes HDL. Patient reports she has a uterine prolapse, with urinary incontinence especially while sitting to standing position as well as urge incontinence. States she's voiding every hour sometimes she has straining. Denies hematuria, fever, chills ,patient also indicates that she was prescribed vaginal cream, but does not use it every day. She is schedule for Robotic Supracervical Hysterectomy with Bilateral Salpingo-Oophorecotmy Robotic Sacrocolpopexy, Posterior Repair, Cystoscopy, Suburethral sling with Dr. Peck 9/27/23   68 year old Japanese speaking female presents to [facility]. Patient's medical history includes HDL, Stress Incontinence. Patient reports she has a uterine prolapse concerns about her vagina was protruding she has a pessary in place. Patient reports urinary incontinence especially while sitting to standing position as well as urge incontinence. States she's voiding every hour sometimes she has straining. Denies hematuria, fever, chills, patient also indicates that she was prescribed vaginal cream, but does not use it every day. She is schedule for Robotic Supracervical Hysterectomy with Bilateral Salpingo-Oophorecotmy Robotic Sacrocolpopexy, Posterior Repair, Cystoscopy, Suburethral sling with Dr. Peck 9/27/23

## 2023-09-07 NOTE — H&P PST ADULT - NSICDXFAMILYHX_GEN_ALL_CORE_FT
FAMILY HISTORY:  Mother  Still living? Yes, Estimated age: Age Unknown  Family history of cirrhosis of liver, Age at diagnosis: Age Unknown

## 2023-09-07 NOTE — H&P PST ADULT - NSICDXPASTMEDICALHX_GEN_ALL_CORE_FT
PAST MEDICAL HISTORY:  High blood cholesterol level      PAST MEDICAL HISTORY:  Female genital prolapse     Female stress incontinence     High blood cholesterol level

## 2023-09-07 NOTE — DISCUSSION/SUMMARY
[FreeTextEntry1] : Ms. HAMM presented to the office today for counseling regarding her decision for possible pelvic reconstructive surgery. All pertinent prior studies including urodynamic were reviewed. 						 She was counseled regarding alternative non-surgical therapies as well as the prognosis with no intervention.  The patient was advised regarding various surgical options including abdominal, robotic/laparascopic and vaginal approaches. The risks and benefits of surgery using endogenous tissue only versus the use of graft insertion (mesh) were fully reviewed.  She was informed of the potential for improved durability and the inherent risk of graft use including but not limited to infection, erosion, pain, pain with intercourse, cervical elongation, disturbance in bowel or bladder function, any of which may require additional surgery for mesh revision.  She is aware that the mesh used in her surgery is a permanent mesh.    The general risks of the surgery were reviewed including, but not limited to infection, bleeding, surrounding organ or tissue injury, failure meaning recurrent prolapse, leaking, voiding dysfunction, needing to go home with a catheter and the risks of anesthesia.  The approximate length of the surgery, hospital stay and postoperative recovery period were reviewed, including a general overview of convalescence and postoperative followup.  The patient is aware that learner's (medical students/residents/fellows) may be participating in a pelvic exam under anesthesia.  The patient verbalized a desire to proceed with the surgery.  Appropriate informed consent was obtained for robotic HERI BSO SCP sling and cysto.  All questions were answered to the patient's satisfaction and we are looking to schedule her.

## 2023-09-10 LAB
CULTURE RESULTS: SIGNIFICANT CHANGE UP
SPECIMEN SOURCE: SIGNIFICANT CHANGE UP

## 2023-09-22 ENCOUNTER — APPOINTMENT (OUTPATIENT)
Dept: UROGYNECOLOGY | Facility: CLINIC | Age: 68
End: 2023-09-22
Payer: MEDICARE

## 2023-09-22 PROBLEM — E78.00 PURE HYPERCHOLESTEROLEMIA, UNSPECIFIED: Chronic | Status: ACTIVE | Noted: 2023-09-07

## 2023-09-22 PROBLEM — N81.9 FEMALE GENITAL PROLAPSE, UNSPECIFIED: Chronic | Status: ACTIVE | Noted: 2023-09-07

## 2023-09-22 PROBLEM — N39.3 STRESS INCONTINENCE (FEMALE) (MALE): Chronic | Status: ACTIVE | Noted: 2023-09-07

## 2023-09-22 PROCEDURE — 99212 OFFICE O/P EST SF 10 MIN: CPT

## 2023-09-26 ENCOUNTER — TRANSCRIPTION ENCOUNTER (OUTPATIENT)
Age: 68
End: 2023-09-26

## 2023-09-27 ENCOUNTER — INPATIENT (INPATIENT)
Facility: HOSPITAL | Age: 68
LOS: 0 days | Discharge: HOME | DRG: 743 | End: 2023-09-28
Attending: OBSTETRICS & GYNECOLOGY | Admitting: OBSTETRICS & GYNECOLOGY
Payer: COMMERCIAL

## 2023-09-27 ENCOUNTER — TRANSCRIPTION ENCOUNTER (OUTPATIENT)
Age: 68
End: 2023-09-27

## 2023-09-27 ENCOUNTER — RESULT REVIEW (OUTPATIENT)
Age: 68
End: 2023-09-27

## 2023-09-27 ENCOUNTER — APPOINTMENT (OUTPATIENT)
Dept: UROGYNECOLOGY | Facility: HOSPITAL | Age: 68
End: 2023-09-27

## 2023-09-27 VITALS
HEIGHT: 60 IN | RESPIRATION RATE: 16 BRPM | TEMPERATURE: 98 F | HEART RATE: 67 BPM | OXYGEN SATURATION: 99 % | WEIGHT: 158.73 LBS | SYSTOLIC BLOOD PRESSURE: 142 MMHG | DIASTOLIC BLOOD PRESSURE: 83 MMHG

## 2023-09-27 DIAGNOSIS — Z98.51 TUBAL LIGATION STATUS: Chronic | ICD-10-CM

## 2023-09-27 DIAGNOSIS — N39.3 STRESS INCONTINENCE (FEMALE) (MALE): ICD-10-CM

## 2023-09-27 DIAGNOSIS — N81.9 FEMALE GENITAL PROLAPSE, UNSPECIFIED: ICD-10-CM

## 2023-09-27 LAB — BLD GP AB SCN SERPL QL: SIGNIFICANT CHANGE UP

## 2023-09-27 PROCEDURE — 88307 TISSUE EXAM BY PATHOLOGIST: CPT | Mod: 26

## 2023-09-27 PROCEDURE — 88302 TISSUE EXAM BY PATHOLOGIST: CPT | Mod: 26

## 2023-09-27 DEVICE — MESH UPSYLON Y
Type: IMPLANTABLE DEVICE | Status: NON-FUNCTIONAL
Removed: 2023-09-27

## 2023-09-27 DEVICE — SLING TRANSVAGINAL MID-URETHRAL ADVANTAGE FIT BLUE
Type: IMPLANTABLE DEVICE | Status: NON-FUNCTIONAL
Removed: 2023-09-27

## 2023-09-27 RX ORDER — ONDANSETRON 8 MG/1
8 TABLET, FILM COATED ORAL EVERY 8 HOURS
Refills: 0 | Status: DISCONTINUED | OUTPATIENT
Start: 2023-09-27 | End: 2023-09-28

## 2023-09-27 RX ORDER — METRONIDAZOLE 500 MG
500 TABLET ORAL ONCE
Refills: 0 | Status: DISCONTINUED | OUTPATIENT
Start: 2023-09-27 | End: 2023-09-27

## 2023-09-27 RX ORDER — SODIUM CHLORIDE 9 MG/ML
3 INJECTION INTRAMUSCULAR; INTRAVENOUS; SUBCUTANEOUS EVERY 8 HOURS
Refills: 0 | Status: DISCONTINUED | OUTPATIENT
Start: 2023-09-27 | End: 2023-09-27

## 2023-09-27 RX ORDER — CELECOXIB 200 MG/1
200 CAPSULE ORAL ONCE
Refills: 0 | Status: COMPLETED | OUTPATIENT
Start: 2023-09-27 | End: 2023-09-27

## 2023-09-27 RX ORDER — SODIUM CHLORIDE 9 MG/ML
1000 INJECTION, SOLUTION INTRAVENOUS
Refills: 0 | Status: DISCONTINUED | OUTPATIENT
Start: 2023-09-27 | End: 2023-09-28

## 2023-09-27 RX ORDER — POLYETHYLENE GLYCOL 3350 17 G/17G
17 POWDER, FOR SOLUTION ORAL AT BEDTIME
Refills: 0 | Status: DISCONTINUED | OUTPATIENT
Start: 2023-09-27 | End: 2023-09-28

## 2023-09-27 RX ORDER — CEFAZOLIN SODIUM 1 G
2000 VIAL (EA) INJECTION ONCE
Refills: 0 | Status: DISCONTINUED | OUTPATIENT
Start: 2023-09-27 | End: 2023-09-27

## 2023-09-27 RX ORDER — FENTANYL CITRATE 50 UG/ML
25 INJECTION INTRAVENOUS
Refills: 0 | Status: DISCONTINUED | OUTPATIENT
Start: 2023-09-27 | End: 2023-09-27

## 2023-09-27 RX ORDER — ACETAMINOPHEN 500 MG
975 TABLET ORAL EVERY 6 HOURS
Refills: 0 | Status: DISCONTINUED | OUTPATIENT
Start: 2023-09-27 | End: 2023-09-28

## 2023-09-27 RX ORDER — KETOROLAC TROMETHAMINE 30 MG/ML
15 SYRINGE (ML) INJECTION EVERY 8 HOURS
Refills: 0 | Status: COMPLETED | OUTPATIENT
Start: 2023-09-27 | End: 2023-09-28

## 2023-09-27 RX ORDER — ACETAMINOPHEN 500 MG
975 TABLET ORAL ONCE
Refills: 0 | Status: COMPLETED | OUTPATIENT
Start: 2023-09-27 | End: 2023-09-27

## 2023-09-27 RX ORDER — SIMETHICONE 80 MG/1
80 TABLET, CHEWABLE ORAL EVERY 6 HOURS
Refills: 0 | Status: DISCONTINUED | OUTPATIENT
Start: 2023-09-27 | End: 2023-09-28

## 2023-09-27 RX ORDER — ENOXAPARIN SODIUM 100 MG/ML
40 INJECTION SUBCUTANEOUS EVERY 24 HOURS
Refills: 0 | Status: DISCONTINUED | OUTPATIENT
Start: 2023-09-27 | End: 2023-09-28

## 2023-09-27 RX ORDER — ONDANSETRON 8 MG/1
4 TABLET, FILM COATED ORAL ONCE
Refills: 0 | Status: DISCONTINUED | OUTPATIENT
Start: 2023-09-27 | End: 2023-09-27

## 2023-09-27 RX ORDER — OXYCODONE HYDROCHLORIDE 5 MG/1
5 TABLET ORAL
Refills: 0 | Status: DISCONTINUED | OUTPATIENT
Start: 2023-09-27 | End: 2023-09-28

## 2023-09-27 RX ORDER — HYDROMORPHONE HYDROCHLORIDE 2 MG/ML
0.5 INJECTION INTRAMUSCULAR; INTRAVENOUS; SUBCUTANEOUS
Refills: 0 | Status: DISCONTINUED | OUTPATIENT
Start: 2023-09-27 | End: 2023-09-27

## 2023-09-27 RX ADMIN — Medication 15 MILLIGRAM(S): at 22:55

## 2023-09-27 RX ADMIN — Medication 975 MILLIGRAM(S): at 19:41

## 2023-09-27 RX ADMIN — ENOXAPARIN SODIUM 40 MILLIGRAM(S): 100 INJECTION SUBCUTANEOUS at 23:59

## 2023-09-27 RX ADMIN — Medication 15 MILLIGRAM(S): at 21:57

## 2023-09-27 RX ADMIN — HYDROMORPHONE HYDROCHLORIDE 0.5 MILLIGRAM(S): 2 INJECTION INTRAMUSCULAR; INTRAVENOUS; SUBCUTANEOUS at 16:15

## 2023-09-27 RX ADMIN — Medication 975 MILLIGRAM(S): at 20:35

## 2023-09-27 RX ADMIN — HYDROMORPHONE HYDROCHLORIDE 0.5 MILLIGRAM(S): 2 INJECTION INTRAMUSCULAR; INTRAVENOUS; SUBCUTANEOUS at 15:30

## 2023-09-27 RX ADMIN — POLYETHYLENE GLYCOL 3350 17 GRAM(S): 17 POWDER, FOR SOLUTION ORAL at 21:57

## 2023-09-27 RX ADMIN — Medication 975 MILLIGRAM(S): at 09:04

## 2023-09-27 RX ADMIN — HYDROMORPHONE HYDROCHLORIDE 0.5 MILLIGRAM(S): 2 INJECTION INTRAMUSCULAR; INTRAVENOUS; SUBCUTANEOUS at 16:01

## 2023-09-27 RX ADMIN — CELECOXIB 200 MILLIGRAM(S): 200 CAPSULE ORAL at 09:05

## 2023-09-27 RX ADMIN — Medication 975 MILLIGRAM(S): at 23:56

## 2023-09-27 RX ADMIN — HYDROMORPHONE HYDROCHLORIDE 0.5 MILLIGRAM(S): 2 INJECTION INTRAMUSCULAR; INTRAVENOUS; SUBCUTANEOUS at 15:09

## 2023-09-27 NOTE — BRIEF OPERATIVE NOTE - OPERATION/FINDINGS
Normal appearing female genitatial with rectocele present. Normal uterus and bilateral tubes and ovaries. Supracervical hysterectomy and bilateral salpingooophorectomy  performed robotically and in usual fashion without difficulty. Sacrocolpopexy performed in usual fashion with Upsylon Y mesh. Suburethral sling performed in suprapubic approach. Posterior vaginal repair done in usual fashion with suture plication and without difficulty. Excellent hemostasis noted Cystoscopy with normal appearing urothelium throughout and + jets form UOs bilaterally. Normal appearing female genitatial with rectocele present. Uterus with multiple fibroids as well as left round ligament fibroid. Normal appearing bilateral tubes and ovaries. Supracervical hysterectomy and bilateral salpingooophorectomy  performed robotically and in usual fashion without difficulty. Sacrocolpopexy performed in usual fashion with Upsylon Y mesh. Suburethral sling performed in suprapubic approach. Posterior vaginal repair done in usual fashion with suture plication and without difficulty. Excellent hemostasis noted Cystoscopy with normal appearing urothelium throughout and + jets form UOs bilaterally.

## 2023-09-27 NOTE — BRIEF OPERATIVE NOTE - NSICDXBRIEFPROCEDURE_GEN_ALL_CORE_FT
PROCEDURES:  Robot-assisted laparoscopic supracervical hysterectomy with cystoscopy 27-Sep-2023 14:39:41  Destiny Ramirez  Placement of polypropylene mesh suburethral sling 27-Sep-2023 14:40:00  Destiny Ramirez  Sacrocolpopexy using mesh 27-Sep-2023 14:40:12  Destiny Ramirez  Colporrhaphy, posterior, for rectocele repair, with perineorrhaphy if indicated 27-Sep-2023 14:40:53  Destiny Ramirez

## 2023-09-27 NOTE — BRIEF OPERATIVE NOTE - NSICDXBRIEFPREOP_GEN_ALL_CORE_FT
PRE-OP DIAGNOSIS:  Urinary, incontinence, stress female 27-Sep-2023 14:41:26  Destiny Ramirez  Rectocele 27-Sep-2023 14:41:17  Destiny Ramirez  Prolapse of female pelvic organs 27-Sep-2023 14:41:09  Destiny Raimrez

## 2023-09-27 NOTE — PROGRESS NOTE ADULT - SUBJECTIVE AND OBJECTIVE BOX
Pt seen and examined at bedside   She reports to be doing well. Pain controlled with pain medications. Tolerating fluids and crackers.  She denies chest pain, sob, N/V, severe abdominal pain, and b/l calf tenderness.        ICU Vital Signs Last 24 Hrs  T(C): 36.3 (27 Sep 2023 17:43), Max: 36.4 (27 Sep 2023 09:01)  T(F): 97.4 (27 Sep 2023 17:43), Max: 97.6 (27 Sep 2023 09:01)  HR: 63 (27 Sep 2023 17:43) (60 - 72)  BP: 119/73 (27 Sep 2023 17:43) (99/65 - 142/83)  BP(mean): 80 (27 Sep 2023 17:00) (72 - 92)  RR: 17 (27 Sep 2023 17:43) (12 - 18)  SpO2: 92% (27 Sep 2023 17:43) (92% - 100%)    O2 Parameters below as of 27 Sep 2023 17:43  Patient On (Oxygen Delivery Method): room air          General: NAD, AAOx3  Abd: soft, NT, ND, 5 robotic port site incisions and mons sling sites x2  clean, dry and intact,   Ext: no calf tenderness  : Albarado in place, no active vaginal bleeding  LE: NT, no edema noted, SCDs       POD#0 s/p Robotic Supracervical bilateral salpingooophorectomy, sacrocolpopexy, sling, cysto and posterior repair  -Pt clinically, vitally and hemodynamically stable  -Packing and albarado in place  -F/U labs in AM  -F/U active TOV in Am  - advance diet to regular  -Pain management continue with tylenol/Toridal as ordered.         Discussed with UROGYN attending Dr. Liv Ramirez DO Pt seen and examined at bedside   She reports to be doing well. Pain controlled with pain medications. Tolerating fluids and crackers.  She denies chest pain, sob, N/V, severe abdominal pain, and b/l calf tenderness.        ICU Vital Signs Last 24 Hrs  T(C): 36.3 (27 Sep 2023 17:43), Max: 36.4 (27 Sep 2023 09:01)  T(F): 97.4 (27 Sep 2023 17:43), Max: 97.6 (27 Sep 2023 09:01)  HR: 63 (27 Sep 2023 17:43) (60 - 72)  BP: 119/73 (27 Sep 2023 17:43) (99/65 - 142/83)  BP(mean): 80 (27 Sep 2023 17:00) (72 - 92)  RR: 17 (27 Sep 2023 17:43) (12 - 18)  SpO2: 92% (27 Sep 2023 17:43) (92% - 100%)    O2 Parameters below as of 27 Sep 2023 17:43  Patient On (Oxygen Delivery Method): room air          General: NAD, AAOx3  Abd: soft, NT, ND, 5 robotic port site incisions and mons sling sites x2  clean, dry and intact,   Ext: no calf tenderness  : Albarado in place,packing in place, no active vaginal bleeding  LE: NT, no edema noted, SCDs       POD#0 s/p Robotic Supracervical bilateral salpingooophorectomy, sacrocolpopexy, sling, cysto and posterior repair  -Pt clinically, vitally and hemodynamically stable  -Packing and albarado in place  -F/U labs in AM  -F/U active TOV in Am  - advance diet to regular  -Pain management continue with tylenol/Toridal as ordered.         Discussed with UROGYN attending Dr. Liv Ramirez DO

## 2023-09-27 NOTE — BRIEF OPERATIVE NOTE - NSICDXBRIEFPOSTOP_GEN_ALL_CORE_FT
POST-OP DIAGNOSIS:  Prolapse of female pelvic organs 27-Sep-2023 14:41:44  Destiny Ramirez  Urinary, incontinence, stress female 27-Sep-2023 14:41:31  Destiny Ramirez  Rectocele 27-Sep-2023 14:41:38  Destiny Ramirez

## 2023-09-27 NOTE — PROGRESS NOTE ADULT - REASON FOR ADMISSION
S/p Robotic Supracervical bilateral salpingooophorectomy, sacrocolpopexy, sling, cysto and posterior repair

## 2023-09-28 ENCOUNTER — TRANSCRIPTION ENCOUNTER (OUTPATIENT)
Age: 68
End: 2023-09-28

## 2023-09-28 VITALS
SYSTOLIC BLOOD PRESSURE: 120 MMHG | OXYGEN SATURATION: 97 % | DIASTOLIC BLOOD PRESSURE: 61 MMHG | RESPIRATION RATE: 18 BRPM | TEMPERATURE: 98 F | HEART RATE: 69 BPM

## 2023-09-28 LAB
ANION GAP SERPL CALC-SCNC: 11 MMOL/L — SIGNIFICANT CHANGE UP (ref 5–17)
BUN SERPL-MCNC: 12.2 MG/DL — SIGNIFICANT CHANGE UP (ref 8–20)
CALCIUM SERPL-MCNC: 7.9 MG/DL — LOW (ref 8.4–10.5)
CHLORIDE SERPL-SCNC: 109 MMOL/L — HIGH (ref 96–108)
CO2 SERPL-SCNC: 23 MMOL/L — SIGNIFICANT CHANGE UP (ref 22–29)
CREAT SERPL-MCNC: 0.59 MG/DL — SIGNIFICANT CHANGE UP (ref 0.5–1.3)
EGFR: 98 ML/MIN/1.73M2 — SIGNIFICANT CHANGE UP
GLUCOSE SERPL-MCNC: 137 MG/DL — HIGH (ref 70–99)
HCT VFR BLD CALC: 37.5 % — SIGNIFICANT CHANGE UP (ref 34.5–45)
HGB BLD-MCNC: 12.2 G/DL — SIGNIFICANT CHANGE UP (ref 11.5–15.5)
MCHC RBC-ENTMCNC: 28.6 PG — SIGNIFICANT CHANGE UP (ref 27–34)
MCHC RBC-ENTMCNC: 32.5 GM/DL — SIGNIFICANT CHANGE UP (ref 32–36)
MCV RBC AUTO: 88 FL — SIGNIFICANT CHANGE UP (ref 80–100)
PLATELET # BLD AUTO: 251 K/UL — SIGNIFICANT CHANGE UP (ref 150–400)
POTASSIUM SERPL-MCNC: 4 MMOL/L — SIGNIFICANT CHANGE UP (ref 3.5–5.3)
POTASSIUM SERPL-SCNC: 4 MMOL/L — SIGNIFICANT CHANGE UP (ref 3.5–5.3)
RBC # BLD: 4.26 M/UL — SIGNIFICANT CHANGE UP (ref 3.8–5.2)
RBC # FLD: 13.4 % — SIGNIFICANT CHANGE UP (ref 10.3–14.5)
SODIUM SERPL-SCNC: 143 MMOL/L — SIGNIFICANT CHANGE UP (ref 135–145)
WBC # BLD: 8.53 K/UL — SIGNIFICANT CHANGE UP (ref 3.8–10.5)
WBC # FLD AUTO: 8.53 K/UL — SIGNIFICANT CHANGE UP (ref 3.8–10.5)

## 2023-09-28 PROCEDURE — 86850 RBC ANTIBODY SCREEN: CPT

## 2023-09-28 PROCEDURE — C9399: CPT

## 2023-09-28 PROCEDURE — 85027 COMPLETE CBC AUTOMATED: CPT

## 2023-09-28 PROCEDURE — 86900 BLOOD TYPING SEROLOGIC ABO: CPT

## 2023-09-28 PROCEDURE — C1771: CPT

## 2023-09-28 PROCEDURE — 86901 BLOOD TYPING SEROLOGIC RH(D): CPT

## 2023-09-28 PROCEDURE — C1781: CPT

## 2023-09-28 PROCEDURE — 88307 TISSUE EXAM BY PATHOLOGIST: CPT

## 2023-09-28 PROCEDURE — 36415 COLL VENOUS BLD VENIPUNCTURE: CPT

## 2023-09-28 PROCEDURE — S2900: CPT

## 2023-09-28 PROCEDURE — 80048 BASIC METABOLIC PNL TOTAL CA: CPT

## 2023-09-28 PROCEDURE — 88302 TISSUE EXAM BY PATHOLOGIST: CPT

## 2023-09-28 RX ORDER — ACETAMINOPHEN 500 MG
3 TABLET ORAL
Qty: 0 | Refills: 0 | DISCHARGE
Start: 2023-09-28

## 2023-09-28 RX ORDER — IBUPROFEN 200 MG
1 TABLET ORAL
Qty: 0 | Refills: 0 | DISCHARGE

## 2023-09-28 RX ORDER — POLYETHYLENE GLYCOL 3350 17 G/17G
17 POWDER, FOR SOLUTION ORAL
Qty: 0 | Refills: 0 | DISCHARGE
Start: 2023-09-28

## 2023-09-28 RX ORDER — SIMETHICONE 80 MG/1
1 TABLET, CHEWABLE ORAL
Qty: 0 | Refills: 0 | DISCHARGE
Start: 2023-09-28

## 2023-09-28 RX ADMIN — Medication 975 MILLIGRAM(S): at 00:52

## 2023-09-28 RX ADMIN — Medication 975 MILLIGRAM(S): at 06:08

## 2023-09-28 RX ADMIN — Medication 15 MILLIGRAM(S): at 06:09

## 2023-09-28 NOTE — PROGRESS NOTE ADULT - SUBJECTIVE AND OBJECTIVE BOX
Pt seen and examined at bedside. no acute events over night  She reports to be doing well. Pain controlled with pain medications. Tolerating regular diet and passing gas. OOB walking to the bathroom without difficulty. States she is eager to go home  She denies chest pain, sob, N/V, severe abdominal pain, and b/l calf tenderness.   Active TOV  voided > 400    ICU Vital Signs Last 24 Hrs  T(C): 36.5 (28 Sep 2023 04:01), Max: 36.5 (28 Sep 2023 04:01)  T(F): 97.7 (28 Sep 2023 04:01), Max: 97.7 (28 Sep 2023 04:01)  HR: 60 (28 Sep 2023 04:01) (60 - 72)  BP: 111/67 (28 Sep 2023 04:01) (99/65 - 142/83)  BP(mean): 80 (27 Sep 2023 17:00) (72 - 92)  RR: 16 (28 Sep 2023 04:01) (12 - 18)  SpO2: 95% (28 Sep 2023 04:01) (92% - 100%)    O2 Parameters below as of 28 Sep 2023 04:01  Patient On (Oxygen Delivery Method): room air                          12.2   8.53  )-----------( 251      ( 28 Sep 2023 05:00 )             37.5   09-28    143  |  109<H>  |  12.2  ----------------------------<  137<H>  4.0   |  23.0  |  0.59    Ca    7.9<L>      28 Sep 2023 05:00          General: NAD, AAOx3  Abd: soft, NT, ND, 5 robotic port site incisions and mons sling sites x2  clean, dry and intact,   Ext: no calf tenderness  : Jordan removed, packing removed    Vaginal exam:  no active vaginal bleeding on pad  LE: NT, no edema noted, SCDs       POD#1 s/p Robotic Supracervical bilateral salpingooophorectomy, sacrocolpopexy, sling, cysto and posterior repair  -Pt clinically, vitally and hemodynamically stable  -Am Labs: H/H12.2/37.5 BUN/Cr 12.2/0.59  -Passed active TOV   -Meeting  milestones: Tolerating PO, OOB, walking  -Pain management continue tylenol and motrin as ordered.   -Post op instructions reviewed - patient verbalized understanding  -Patient for discharge        Discussed with UROGYN attending Dr. Liv Ramirez, DO Pt seen and examined at bedside. no acute events over night  She reports to be doing well. Pain controlled with pain medications. Tolerating regular diet and passing gas. OOB walking to the bathroom without difficulty. States she is eager to go home  She denies chest pain, sob, N/V, severe abdominal pain, and b/l calf tenderness.   Active TOV  voided > 400    ICU Vital Signs Last 24 Hrs  T(C): 36.5 (28 Sep 2023 04:01), Max: 36.5 (28 Sep 2023 04:01)  T(F): 97.7 (28 Sep 2023 04:01), Max: 97.7 (28 Sep 2023 04:01)  HR: 60 (28 Sep 2023 04:01) (60 - 72)  BP: 111/67 (28 Sep 2023 04:01) (99/65 - 142/83)  BP(mean): 80 (27 Sep 2023 17:00) (72 - 92)  RR: 16 (28 Sep 2023 04:01) (12 - 18)  SpO2: 95% (28 Sep 2023 04:01) (92% - 100%)    O2 Parameters below as of 28 Sep 2023 04:01  Patient On (Oxygen Delivery Method): room air                          12.2   8.53  )-----------( 251      ( 28 Sep 2023 05:00 )             37.5   09-28    143  |  109<H>  |  12.2  ----------------------------<  137<H>  4.0   |  23.0  |  0.59    Ca    7.9<L>      28 Sep 2023 05:00          General: NAD, AAOx3  Abd: soft, NT, ND, 5 robotic port site incisions and mons sling sites x2  clean, dry and intact,   Ext: no calf tenderness  : Jordan removed, packing removed    Vaginal exam: minimal red blood on pad, no active bleeding  LE: NT, no edema noted, SCDs       POD#1 s/p Robotic Supracervical bilateral salpingooophorectomy, sacrocolpopexy, sling, cysto and posterior repair  -Pt clinically, vitally and hemodynamically stable  -Am Labs: H/H12.2/37.5 BUN/Cr 12.2/0.59  -Passed active TOV   -Meeting  milestones: Tolerating PO, OOB, walking  -Pain management continue tylenol and motrin as ordered.   -Post op instructions reviewed - patient verbalized understanding  -Patient for discharge        Discussed with UROGYN attending Dr. Liv Ramirez, DO

## 2023-09-28 NOTE — DISCHARGE NOTE PROVIDER - HOSPITAL COURSE
Patient underwent  Robotic Supracervical hyst bilateral salpingooophorectomy, sacrocolpopexy, sling, cysto and posterior repair. Post op course uncomplicated, patient passed active TOV, reached post op milestones. Clinically and hemodynamically stable, ready to go home.

## 2023-09-28 NOTE — DISCHARGE NOTE PROVIDER - CARE PROVIDER_API CALL
Dez Peck  Urogynecology  [address] [place] [id number]  Phone: [phone]  Fax: [phone]  Established Patient  Follow Up Time: 2 weeks

## 2023-09-28 NOTE — DISCHARGE NOTE PROVIDER - NSDCCPCAREPLAN_GEN_ALL_CORE_FT
PRINCIPAL DISCHARGE DIAGNOSIS  Diagnosis: Female genital prolapse  Assessment and Plan of Treatment:       SECONDARY DISCHARGE DIAGNOSES  Diagnosis: High blood cholesterol  Assessment and Plan of Treatment:     Diagnosis: Stress incontinence  Assessment and Plan of Treatment:

## 2023-09-28 NOTE — DISCHARGE NOTE PROVIDER - NSDCFUSCHEDAPPT_GEN_ALL_CORE_FT
[facility] Physician [facility]  UROGYN [address]  Scheduled Appointment: 10/12/2023    Dez Peck  [facility]  UROGYN [address]  Scheduled Appointment: 11/09/2023

## 2023-09-28 NOTE — DISCHARGE NOTE PROVIDER - NSDCCPTREATMENT_GEN_ALL_CORE_FT
PRINCIPAL PROCEDURE  Procedure: Hysterectomy, supracervical, robot-assisted, laparoscopic, with cystoscopy  Findings and Treatment:       SECONDARY PROCEDURE  Procedure: Bilateral salpingoophorectomy  Findings and Treatment:     Procedure: Laparoscopic sacrocolpopexy  Findings and Treatment:     Procedure: Removal, sling, suburethral, vaginal approach  Findings and Treatment:

## 2023-09-28 NOTE — DISCHARGE NOTE NURSING/CASE MANAGEMENT/SOCIAL WORK - PATIENT PORTAL LINK FT
You can access the FollowMyHealth Patient Portal offered by [facility] by registering at the following website: [email or web address]. By joining [facility]’s FollowMyHealth portal, you will also be able to view your health information using other applications (apps) compatible with our system.

## 2023-09-28 NOTE — DISCHARGE NOTE PROVIDER - REASON FOR ADMISSION
Robotic Supracervical hyst, bilateral salpingooophorectomy, sacrocolpopexy, sling, cysto and posterior repair

## 2023-10-12 ENCOUNTER — APPOINTMENT (OUTPATIENT)
Dept: UROGYNECOLOGY | Facility: CLINIC | Age: 68
End: 2023-10-12
Payer: MEDICARE

## 2023-10-12 VITALS — DIASTOLIC BLOOD PRESSURE: 82 MMHG | SYSTOLIC BLOOD PRESSURE: 137 MMHG

## 2023-10-12 LAB
BILIRUB UR QL STRIP: NEGATIVE
CLARITY UR: CLEAR
COLLECTION METHOD: NORMAL
GLUCOSE UR-MCNC: NEGATIVE
HCG UR QL: 0.2 EU/DL
HGB UR QL STRIP.AUTO: NORMAL
KETONES UR-MCNC: NORMAL
LEUKOCYTE ESTERASE UR QL STRIP: NORMAL
NITRITE UR QL STRIP: NEGATIVE
PH UR STRIP: 6
PROT UR STRIP-MCNC: NEGATIVE
SP GR UR STRIP: 1.02

## 2023-10-12 PROCEDURE — 99024 POSTOP FOLLOW-UP VISIT: CPT

## 2023-10-12 PROCEDURE — 51798 US URINE CAPACITY MEASURE: CPT

## 2023-10-12 PROCEDURE — 81003 URINALYSIS AUTO W/O SCOPE: CPT | Mod: QW

## 2023-11-09 ENCOUNTER — APPOINTMENT (OUTPATIENT)
Dept: UROGYNECOLOGY | Facility: CLINIC | Age: 68
End: 2023-11-09
Payer: MEDICARE

## 2023-11-09 VITALS
DIASTOLIC BLOOD PRESSURE: 83 MMHG | BODY MASS INDEX: 28.52 KG/M2 | HEIGHT: 62 IN | SYSTOLIC BLOOD PRESSURE: 130 MMHG | WEIGHT: 155 LBS

## 2023-11-09 DIAGNOSIS — Z98.890 OTHER SPECIFIED POSTPROCEDURAL STATES: ICD-10-CM

## 2023-11-09 LAB
BILIRUB UR QL STRIP: NEGATIVE
CLARITY UR: CLEAR
COLLECTION METHOD: NORMAL
GLUCOSE UR-MCNC: NEGATIVE
HCG UR QL: 0.2 EU/DL
HGB UR QL STRIP.AUTO: NORMAL
KETONES UR-MCNC: NEGATIVE
LEUKOCYTE ESTERASE UR QL STRIP: NEGATIVE
NITRITE UR QL STRIP: NEGATIVE
PH UR STRIP: 6
PROT UR STRIP-MCNC: NORMAL
SP GR UR STRIP: 1.03

## 2023-11-09 PROCEDURE — 81003 URINALYSIS AUTO W/O SCOPE: CPT | Mod: QW

## 2023-11-09 PROCEDURE — 51798 US URINE CAPACITY MEASURE: CPT

## 2023-11-09 PROCEDURE — 99024 POSTOP FOLLOW-UP VISIT: CPT

## 2024-01-05 ENCOUNTER — NON-APPOINTMENT (OUTPATIENT)
Age: 69
End: 2024-01-05

## 2024-01-05 ENCOUNTER — APPOINTMENT (OUTPATIENT)
Dept: ORTHOPEDIC SURGERY | Facility: CLINIC | Age: 69
End: 2024-01-05
Payer: MEDICARE

## 2024-01-05 VITALS
HEART RATE: 74 BPM | BODY MASS INDEX: 28.89 KG/M2 | SYSTOLIC BLOOD PRESSURE: 115 MMHG | HEIGHT: 62 IN | WEIGHT: 157 LBS | DIASTOLIC BLOOD PRESSURE: 76 MMHG

## 2024-01-05 PROCEDURE — 99213 OFFICE O/P EST LOW 20 MIN: CPT | Mod: 25

## 2024-01-05 PROCEDURE — 73562 X-RAY EXAM OF KNEE 3: CPT | Mod: RT

## 2024-01-05 PROCEDURE — 20610 DRAIN/INJ JOINT/BURSA W/O US: CPT | Mod: RT

## 2024-01-05 RX ORDER — MELOXICAM 7.5 MG/1
7.5 TABLET ORAL
Qty: 30 | Refills: 2 | Status: ACTIVE | COMMUNITY
Start: 2024-01-05 | End: 1900-01-01

## 2024-01-05 NOTE — REASON FOR VISIT
[Initial Visit] : an initial visit for [Follow-Up Visit] : a follow-up visit for [Knee Pain] : knee pain

## 2024-01-09 NOTE — PHYSICAL EXAM
[de-identified] : The patient is conversive and in no apparent distress. The patient is alert and oriented to person, place, and time. Affect and mood appear normal. The head is normocephalic and atraumatic. Skin shows normal turgor with no evidence of eczema or psoriasis. No respiratory distress noted. Sensation grossly intact. MUSCULOSKELETAL:   SEE BELOW  Right knee exam demonstrates skin is clean, dry intact.  No surgical scars.  No signs of acute trauma.  Small effusion.  Normal temperature.  No erythema or ecchymosis.  Passive range of motion is -3 degrees extension to 120 degrees of flexion.  There is discomfort with terminal flexion.  Minimal patellofemoral crepitus.  Mild laxity with medial/lateral stress testing.  No significant varicose veins or open wounds distally.  No large venous stasis. [de-identified] : Three-view right knee x-rays were reviewed.  Moderate medial joint space narrowing.  Small osteophyte formation is noted the medial tibial plateau region as well as the posterior femoral condyle.

## 2024-01-09 NOTE — DISCUSSION/SUMMARY
[de-identified] : 25 minutes was spent reviewing the x-rays as well as discussing with the patient their clinical presentation, diagnosis and providing education.  The x-rays taken today reviewed the patient.  She was shown the medial compartment joint space narrowing.  She was also shown the osteophyte formation.  At this time the patient is recommended a corticosteroid injection and received the injection today atraumatically.  The patient was thoroughly educated on postinjection expectations..  Additionally she is prescribed meloxicam to be used in place of ibuprofen.  The patient will be seen back in approximately 2 months time for routine reevaluation.  If she is under percent improved she will be seen back as needed.  If the patient does not have any sustained improvement from the current treatment plan, knee arthroplasty will be discussed with the patient.  She will continue to remain active and perform activities as tolerated.  All questions were answered to the patient's satisfaction.

## 2024-01-09 NOTE — HISTORY OF PRESENT ILLNESS
[de-identified] : Patient presents today for initial evaluation of right knee pain.  She has had the pain for about a year now.  She has been managed by her primary care.  She did have x-rays performed.  She was told that she has some arthritis.  Back in September she was sent for physical therapy.  She states she is about 80% better.  She intermittently takes ibuprofen for pain control.  She reports of significant night knee pain.  She denies of any trauma.  She has not had any recent injections.  She did previously have a steroid injection in the right knee about 12 years ago.  She is not using a cane or brace.  No walker.  No past knee surgeries reported.  Review of Systems- Constitutional: No fever or chills.  Cardiovascular: No orthopnea or chest pain Pulmonary: No shortness of breath.  GI: No nausea or vomiting or abdominal pain. Musculoskeletal: see HPI  Psychiatric: No anxiety and depression.

## 2024-01-09 NOTE — PROCEDURE
[Injection] : Injection [Right] : of the right [Effusion] : Effusion [Osteoarthritis] : Osteoarthritis [Inflammation] : Inflammation [Diagnostic] : Diagnostic [Joint Pain] : Joint pain [Patient] : patient [Risk] : risk [Benefits] : benefits [Alternatives] : alternatives [Bleeding] : bleeding [Infection] : infection [Allergic Reaction] : allergic reaction [Verbal Consent Obtained] : verbal consent was obtained prior to the procedure [Ethyl Chloride Spray] : ethyl chloride spray was used as a topical anesthetic [Medial] : medial [1% Lidocaine___(mL)] : [unfilled] mL of 1% Lidocaine [Methylpred. 40mg/mL___(mL)] : [unfilled] mL of 40mg/mL methylprednisolone [Tolerated Well] : The patient tolerated the procedure well [None] : none [FreeTextEntry1] : Chlorhexidine

## 2024-04-01 ENCOUNTER — APPOINTMENT (OUTPATIENT)
Dept: UROGYNECOLOGY | Facility: CLINIC | Age: 69
End: 2024-04-01
Payer: MEDICARE

## 2024-04-01 VITALS
HEIGHT: 60 IN | SYSTOLIC BLOOD PRESSURE: 137 MMHG | WEIGHT: 159 LBS | DIASTOLIC BLOOD PRESSURE: 80 MMHG | BODY MASS INDEX: 31.22 KG/M2 | HEART RATE: 89 BPM

## 2024-04-01 DIAGNOSIS — R32 UNSPECIFIED URINARY INCONTINENCE: ICD-10-CM

## 2024-04-01 DIAGNOSIS — N81.9 FEMALE GENITAL PROLAPSE, UNSPECIFIED: ICD-10-CM

## 2024-04-01 LAB
BILIRUB UR QL STRIP: NEGATIVE
CLARITY UR: CLEAR
COLLECTION METHOD: NORMAL
GLUCOSE UR-MCNC: NEGATIVE
HCG UR QL: 1 EU/DL
HGB UR QL STRIP.AUTO: NORMAL
KETONES UR-MCNC: NEGATIVE
LEUKOCYTE ESTERASE UR QL STRIP: NORMAL
NITRITE UR QL STRIP: NEGATIVE
PH UR STRIP: 6.5
PROT UR STRIP-MCNC: NEGATIVE
SP GR UR STRIP: 1.02

## 2024-04-01 PROCEDURE — 81003 URINALYSIS AUTO W/O SCOPE: CPT | Mod: QW

## 2024-04-01 PROCEDURE — 99213 OFFICE O/P EST LOW 20 MIN: CPT | Mod: 25

## 2024-04-01 PROCEDURE — 51798 US URINE CAPACITY MEASURE: CPT

## 2024-04-01 NOTE — HISTORY OF PRESENT ILLNESS
[FreeTextEntry1] : Shari underwent a robotic HERI BSO SCP RP sling cystoscopy and posterior repair in September 2023.  At her 6-week postop visit she had some incidental loss of urine.  She presents today for follow-up. She is very happy. She is no longer leaking. She does get up 2-3 times at night but admits to drinking tea at night to help with her sleep.  No constipation.

## 2024-04-01 NOTE — PHYSICAL EXAM
[Chaperone Present] : A chaperone was present in the examining room during all aspects of the physical examination [Scar] : a scar was noted [Normal Appearance] : general appearance was normal [Aa ____] : Aa [unfilled] [Ba ____] : Ba [unfilled] [C ____] : C [unfilled] [GH ____] : GH [unfilled] [PB ____] : PB [unfilled] [TVL ____] : TVL  [unfilled] [Ap ____] : Ap [unfilled] [Bp ____] : Bp [unfilled] [D ____] : D [unfilled] [Absent] : absent [Normal] : no abnormalities [FreeTextEntry1] : Sherine [Hernia] : no hernia observed [FreeTextEntry4] : no exposure, graft nontender

## 2024-05-10 ENCOUNTER — APPOINTMENT (OUTPATIENT)
Dept: ORTHOPEDIC SURGERY | Facility: CLINIC | Age: 69
End: 2024-05-10
Payer: MEDICARE

## 2024-05-10 VITALS — SYSTOLIC BLOOD PRESSURE: 121 MMHG | HEART RATE: 84 BPM | DIASTOLIC BLOOD PRESSURE: 80 MMHG

## 2024-05-10 DIAGNOSIS — M17.11 UNILATERAL PRIMARY OSTEOARTHRITIS, RIGHT KNEE: ICD-10-CM

## 2024-05-10 PROCEDURE — G2211 COMPLEX E/M VISIT ADD ON: CPT

## 2024-05-10 PROCEDURE — 99213 OFFICE O/P EST LOW 20 MIN: CPT

## 2024-05-10 RX ADMIN — Medication 0 MG/4ML: at 00:00

## 2024-05-10 NOTE — PHYSICAL EXAM
[de-identified] : The patient is conversive and in no apparent distress. The patient is alert and oriented to person, place, and time. Affect and mood appear normal. The head is normocephalic and atraumatic. Skin shows normal turgor with no evidence of eczema or psoriasis. No respiratory distress noted. Sensation grossly intact. MUSCULOSKELETAL:   SEE BELOW  Right knee exam demonstrates skin is clean, dry intact.  No surgical scars.  No signs of acute trauma.  Moderate effusion.  Normal temperature.  No erythema or ecchymosis.  Passive range of motion is -5 degrees extension to 115 degrees of flexion.  There is discomfort with terminal flexion.  Minimal patellofemoral crepitus.  Mild laxity with medial/lateral stress testing.  No significant varicose veins or open wounds distally.  No large venous stasis. [de-identified] : Three-view right knee x-rays were reviewed.  -3 degree varus alignment. Moderate medial joint space narrowing.  Small osteophyte formation is noted the medial tibial plateau region as well as the posterior femoral condyle.

## 2024-05-10 NOTE — DISCUSSION/SUMMARY
[de-identified] : 25 minutes was spent ordering tests, reviewing past office notes, examining the patient, discussing the clinical presentation and findings, communicating and explaining the diagnosis, ordering medication, providing orthopedic education and documenting the visit in the electronic medical record.  The previous x-rays were reviewed with the patient.  She does have moderate to advanced joint space narrowing of the right knee.  She is clinically symptomatic with a moderate effusion.  She has done well using meloxicam as well as activity modification.  She is avoiding stairs is much as possible.  She continues with a home exercise program.  The patient is nearing a point where she may need surgical intervention.  She would like to try viscosupplementation prior to making that decision.  Gel injections will be ordered.  She will be seen back at the time of approval.  Should she fail the viscosupplementation trial, she will be indicated for knee replacement surgery.  That will likely happen in the fall 2024.  She will continue with meloxicam as needed.  All questions were answered to the patient's satisfaction.

## 2024-05-23 RX ORDER — HYALURONATE SODIUM, STABILIZED 88 MG/4 ML
88 SYRINGE (ML) INTRAARTICULAR
Qty: 1 | Refills: 0 | Status: DISCONTINUED | OUTPATIENT
Start: 2024-05-13 | End: 2024-05-23

## 2024-06-18 RX ORDER — HYLAN G-F 20 16MG/2ML
48 SYRINGE (ML) INTRAARTICULAR
Qty: 1 | Refills: 0 | Status: ACTIVE | OUTPATIENT
Start: 2024-05-23

## 2024-07-05 ENCOUNTER — APPOINTMENT (OUTPATIENT)
Dept: ORTHOPEDIC SURGERY | Facility: CLINIC | Age: 69
End: 2024-07-05
Payer: MEDICARE

## 2024-07-05 DIAGNOSIS — M17.11 UNILATERAL PRIMARY OSTEOARTHRITIS, RIGHT KNEE: ICD-10-CM

## 2024-07-05 PROCEDURE — 73562 X-RAY EXAM OF KNEE 3: CPT | Mod: RT

## 2024-07-05 PROCEDURE — 20610 DRAIN/INJ JOINT/BURSA W/O US: CPT | Mod: RT

## 2024-07-05 PROCEDURE — 99214 OFFICE O/P EST MOD 30 MIN: CPT | Mod: 25

## 2024-07-05 RX ORDER — LIDOCAINE HYDROCHLORIDE 10 MG/ML
1 INJECTION, SOLUTION INFILTRATION; PERINEURAL
Refills: 0 | Status: COMPLETED | OUTPATIENT
Start: 2024-07-05

## 2024-07-05 RX ORDER — HYLAN G-F 20 16MG/2ML
48 SYRINGE (ML) INTRAARTICULAR
Refills: 0 | Status: COMPLETED | OUTPATIENT
Start: 2024-07-05

## 2024-07-05 RX ADMIN — Medication 6 MG/6ML: at 00:00

## 2024-07-05 RX ADMIN — LIDOCAINE HYDROCHLORIDE 5 %: 10 INJECTION, SOLUTION INFILTRATION; PERINEURAL at 00:00

## 2024-10-03 ENCOUNTER — APPOINTMENT (OUTPATIENT)
Dept: UROGYNECOLOGY | Facility: CLINIC | Age: 69
End: 2024-10-03

## 2024-10-03 VITALS
HEART RATE: 83 BPM | DIASTOLIC BLOOD PRESSURE: 79 MMHG | SYSTOLIC BLOOD PRESSURE: 120 MMHG | BODY MASS INDEX: 30.43 KG/M2 | WEIGHT: 155 LBS | HEIGHT: 60 IN

## 2024-10-03 DIAGNOSIS — Z98.890 OTHER SPECIFIED POSTPROCEDURAL STATES: ICD-10-CM

## 2024-10-03 DIAGNOSIS — N81.9 FEMALE GENITAL PROLAPSE, UNSPECIFIED: ICD-10-CM

## 2024-10-03 LAB
BILIRUB UR QL STRIP: NEGATIVE
CLARITY UR: CLEAR
COLLECTION METHOD: NORMAL
GLUCOSE UR-MCNC: NEGATIVE
HCG UR QL: 0.2 EU/DL
HGB UR QL STRIP.AUTO: NORMAL
KETONES UR-MCNC: NEGATIVE
LEUKOCYTE ESTERASE UR QL STRIP: NORMAL
NITRITE UR QL STRIP: NEGATIVE
PH UR STRIP: 6.5
PROT UR STRIP-MCNC: NEGATIVE
SP GR UR STRIP: 1.02

## 2024-10-03 PROCEDURE — 99459 PELVIC EXAMINATION: CPT

## 2024-10-03 PROCEDURE — 51798 US URINE CAPACITY MEASURE: CPT

## 2024-10-03 PROCEDURE — 99213 OFFICE O/P EST LOW 20 MIN: CPT | Mod: 25

## 2024-10-03 PROCEDURE — 81003 URINALYSIS AUTO W/O SCOPE: CPT | Mod: QW

## 2024-10-03 NOTE — PHYSICAL EXAM
[No Acute Distress] : in no acute distress [Well developed] : well developed [Oriented x3] : oriented to person, place, and time [No Edema] : ~T edema was not present [Scar] : a scar was noted [Turgor Normal] : skin turgor ~T was normal [Normal Gait] : gait was normal [Normal Appearance] : general appearance was normal [Aa ____] : Aa [unfilled] [Ba ____] : Ba [unfilled] [C ____] : C [unfilled] [GH ____] : GH [unfilled] [PB ____] : PB [unfilled] [TVL ____] : TVL  [unfilled] [Ap ____] : Ap [unfilled] [Bp ____] : Bp [unfilled] [D ____] : D [unfilled] [Absent] : absent [Normal] : no abnormalities [FreeTextEntry2] : Sonal [Cough] : no cough [Supple] : ~T the neck demonstrated a ~M decrease in suppleness [Tenderness] : ~T no ~M abdominal tenderness observed [Distended] : not distended [Hernia] : no hernia observed [FreeTextEntry4] : no erosion, exposure of mesh, non tender, posterior repiar with good healing

## 2024-10-03 NOTE — DISCUSSION/SUMMARY
[FreeTextEntry1] : Shari underwent a robotic HERI BSO SCP RP sling cystoscopy and posterior repair in September 2023 doing well..  [] exam with no prolapse, no mesh exposure, or tenderness [] Patient to continue with usual and annual care with GYN [] Continue vaginal estrogen twice weekly for vaginal dryness Followup as needed.   Patient to follow up as needed

## 2024-10-03 NOTE — HISTORY OF PRESENT ILLNESS
[Cystocele (Obstetric)] : mild [FreeTextEntry1] : Shari underwent a robotic HERI BSO SCP RP sling cystoscopy and posterior repair in September 2023 presents for followup. Patient reports she is feeling well. Denies pain, vaginal bleeding, vaginal bulge, constipation, difficulty voiding, or urinary leakage States she feels occasional pelvic pressure and occasional continues to have vulvar irritation from dryness.

## (undated) DEVICE — ELCTR GROUNDING PAD ADULT COVIDIEN

## (undated) DEVICE — XI ARM NEEDLE DRIVER SUTURECUT MEGA 8MM

## (undated) DEVICE — PREP CHLORAPREP HI-LITE ORANGE 26ML

## (undated) DEVICE — VENODYNE/SCD SLEEVE CALF MEDIUM

## (undated) DEVICE — XI DRAPE ARM

## (undated) DEVICE — XI ARM FORCEP TENACULUM

## (undated) DEVICE — SUT GORETEX CV-2 (0) 36" THX-26 DA

## (undated) DEVICE — DRAPE ROBOTIC

## (undated) DEVICE — XI TIP COVER

## (undated) DEVICE — GLV 8.5 PROTEXIS (WHITE)

## (undated) DEVICE — PREP TRAY DRY SKIN PREP SCRUB

## (undated) DEVICE — PACK ROBOTIC

## (undated) DEVICE — SUT MONOCRYL 2-0 27" SH VIOLET

## (undated) DEVICE — XI DRAPE COLUMN

## (undated) DEVICE — XI OBTURATOR OPTICAL BLADELESS 8MM

## (undated) DEVICE — POSITIONER PINK PAD PIGAZZI SYSTEM

## (undated) DEVICE — WARMING BLANKET UPPER ADULT

## (undated) DEVICE — PREP DYNA-HEX CHG 4% 4OZ BOTTLE (BACTOSHIELD)

## (undated) DEVICE — SUT VICRYL 0 27" UR-6

## (undated) DEVICE — SUT MONOCRYL 4-0 27" PS-2 UNDYED

## (undated) DEVICE — TUBING CONNECTING 6MM 20FT

## (undated) DEVICE — PACK MINOR WITH LAP

## (undated) DEVICE — PACK GENERAL LAPAROSCOPY

## (undated) DEVICE — GOWN XXXL

## (undated) DEVICE — SUT VLOC 180 2-0 6" GS-22 GREEN

## (undated) DEVICE — XI CORD MONOPOLAR CAUTERY (GREEN)

## (undated) DEVICE — ENDOCATCH 10MM SPECIMEN POUCH

## (undated) DEVICE — DRSG DERMABOND 0.7ML

## (undated) DEVICE — TROCAR SURGIQUEST AIRSEAL 8MMX100MM

## (undated) DEVICE — SOL IRR POUR H2O 1000ML

## (undated) DEVICE — GLV 8 PROTEXIS (WHITE)

## (undated) DEVICE — TUBING AIRSEAL TRI-LUMEN FILTERED

## (undated) DEVICE — SOL IRR POUR NS 0.9% 1000ML